# Patient Record
Sex: FEMALE | Race: WHITE | NOT HISPANIC OR LATINO | Employment: OTHER | URBAN - METROPOLITAN AREA
[De-identification: names, ages, dates, MRNs, and addresses within clinical notes are randomized per-mention and may not be internally consistent; named-entity substitution may affect disease eponyms.]

---

## 2017-04-06 ENCOUNTER — RECORDS - HEALTHEAST (OUTPATIENT)
Dept: LAB | Facility: CLINIC | Age: 77
End: 2017-04-06

## 2017-04-06 LAB
CHOLEST SERPL-MCNC: 169 MG/DL
FASTING STATUS PATIENT QL REPORTED: ABNORMAL
HDLC SERPL-MCNC: 35 MG/DL
LDLC SERPL CALC-MCNC: 103 MG/DL
TRIGL SERPL-MCNC: 153 MG/DL

## 2018-05-01 ENCOUNTER — RECORDS - HEALTHEAST (OUTPATIENT)
Dept: LAB | Facility: CLINIC | Age: 78
End: 2018-05-01

## 2018-05-01 LAB
ANION GAP SERPL CALCULATED.3IONS-SCNC: 10 MMOL/L (ref 5–18)
BUN SERPL-MCNC: 15 MG/DL (ref 8–28)
CALCIUM SERPL-MCNC: 9.9 MG/DL (ref 8.5–10.5)
CHLORIDE BLD-SCNC: 104 MMOL/L (ref 98–107)
CHOLEST SERPL-MCNC: 180 MG/DL
CO2 SERPL-SCNC: 25 MMOL/L (ref 22–31)
CREAT SERPL-MCNC: 0.73 MG/DL (ref 0.6–1.1)
FASTING STATUS PATIENT QL REPORTED: ABNORMAL
GFR SERPL CREATININE-BSD FRML MDRD: >60 ML/MIN/1.73M2
GLUCOSE BLD-MCNC: 91 MG/DL (ref 70–125)
HDLC SERPL-MCNC: 42 MG/DL
LDLC SERPL CALC-MCNC: 114 MG/DL
POTASSIUM BLD-SCNC: 4.7 MMOL/L (ref 3.5–5)
SODIUM SERPL-SCNC: 139 MMOL/L (ref 136–145)
TRIGL SERPL-MCNC: 121 MG/DL

## 2018-10-11 ENCOUNTER — RECORDS - HEALTHEAST (OUTPATIENT)
Dept: ADMINISTRATIVE | Facility: OTHER | Age: 78
End: 2018-10-11

## 2018-10-11 ENCOUNTER — AMBULATORY - HEALTHEAST (OUTPATIENT)
Dept: CARDIOLOGY | Facility: CLINIC | Age: 78
End: 2018-10-11

## 2018-10-17 ENCOUNTER — OFFICE VISIT - HEALTHEAST (OUTPATIENT)
Dept: CARDIOLOGY | Facility: CLINIC | Age: 78
End: 2018-10-17

## 2018-10-17 DIAGNOSIS — I10 ESSENTIAL HYPERTENSION: ICD-10-CM

## 2018-10-17 DIAGNOSIS — R00.2 PALPITATIONS: ICD-10-CM

## 2018-10-17 RX ORDER — INFLIXIMAB 100 MG/10ML
400 INJECTION, POWDER, LYOPHILIZED, FOR SOLUTION INTRAVENOUS
Status: SHIPPED | COMMUNITY
Start: 2016-08-25

## 2018-10-17 ASSESSMENT — MIFFLIN-ST. JEOR: SCORE: 1155.35

## 2018-10-22 ENCOUNTER — RECORDS - HEALTHEAST (OUTPATIENT)
Dept: LAB | Facility: CLINIC | Age: 78
End: 2018-10-22

## 2018-10-22 LAB
ALBUMIN SERPL-MCNC: 3.6 G/DL (ref 3.5–5)
ALP SERPL-CCNC: 86 U/L (ref 45–120)
ALT SERPL W P-5'-P-CCNC: 9 U/L (ref 0–45)
ANION GAP SERPL CALCULATED.3IONS-SCNC: 9 MMOL/L (ref 5–18)
AST SERPL W P-5'-P-CCNC: 17 U/L (ref 0–40)
BILIRUB SERPL-MCNC: 0.3 MG/DL (ref 0–1)
BUN SERPL-MCNC: 14 MG/DL (ref 8–28)
CALCIUM SERPL-MCNC: 9.9 MG/DL (ref 8.5–10.5)
CHLORIDE BLD-SCNC: 104 MMOL/L (ref 98–107)
CO2 SERPL-SCNC: 27 MMOL/L (ref 22–31)
CREAT SERPL-MCNC: 0.73 MG/DL (ref 0.6–1.1)
ERYTHROCYTE [SEDIMENTATION RATE] IN BLOOD BY WESTERGREN METHOD: 43 MM/HR (ref 0–20)
GFR SERPL CREATININE-BSD FRML MDRD: >60 ML/MIN/1.73M2
GLUCOSE BLD-MCNC: 87 MG/DL (ref 70–125)
POTASSIUM BLD-SCNC: 4.1 MMOL/L (ref 3.5–5)
PROT SERPL-MCNC: 7.1 G/DL (ref 6–8)
SODIUM SERPL-SCNC: 140 MMOL/L (ref 136–145)
TSH SERPL DL<=0.005 MIU/L-ACNC: 2.3 UIU/ML (ref 0.3–5)

## 2018-11-07 ENCOUNTER — HOSPITAL ENCOUNTER (OUTPATIENT)
Dept: CARDIOLOGY | Facility: CLINIC | Age: 78
Discharge: HOME | End: 2018-11-07
Attending: INTERNAL MEDICINE

## 2018-11-07 DIAGNOSIS — R00.2 PALPITATIONS: ICD-10-CM

## 2018-11-07 LAB
AORTIC ROOT: 2.7 CM
BSA FOR ECHO PROCEDURE: 1.78 M2
CV BLOOD PRESSURE: NORMAL MMHG
CV ECHO HEIGHT: 65 IN
CV ECHO WEIGHT: 152 LBS
DOP CALC LVOT AREA: 2.83 CM2
DOP CALC LVOT DIAMETER: 1.9 CM
DOP CALC LVOT PEAK VEL: 80.5 CM/S
DOP CALC LVOT STROKE VOLUME: 63.8 CM3
DOP CALCLVOT PEAK VEL VTI: 22.5 CM
EJECTION FRACTION: 56 % (ref 55–75)
FRACTIONAL SHORTENING: 28.2 % (ref 28–44)
INTERVENTRICULAR SEPTUM IN END DIASTOLE: 1 CM (ref 0.6–0.9)
IVS/PW RATIO: 1.2
LA AREA 1: 10.5 CM2
LA AREA 2: 18.3 CM2
LEFT ATRIUM LENGTH: 3.39 CM
LEFT ATRIUM SIZE: 2.9 CM
LEFT ATRIUM TO AORTIC ROOT RATIO: 1.07 NO UNITS
LEFT ATRIUM VOLUME INDEX: 27.1 ML/M2
LEFT ATRIUM VOLUME: 48.2 ML
LEFT VENTRICLE DIASTOLIC VOLUME INDEX: 25.3 CM3/M2 (ref 34–74)
LEFT VENTRICLE DIASTOLIC VOLUME: 45 CM3 (ref 46–106)
LEFT VENTRICLE MASS INDEX: 61.9 G/M2
LEFT VENTRICLE SYSTOLIC VOLUME INDEX: 11.2 CM3/M2 (ref 11–31)
LEFT VENTRICLE SYSTOLIC VOLUME: 20 CM3 (ref 14–42)
LEFT VENTRICULAR INTERNAL DIMENSION IN DIASTOLE: 3.94 CM (ref 3.8–5.2)
LEFT VENTRICULAR INTERNAL DIMENSION IN SYSTOLE: 2.83 CM (ref 2.2–3.5)
LEFT VENTRICULAR MASS: 110.2 G
LEFT VENTRICULAR OUTFLOW TRACT MEAN GRADIENT: 2 MMHG
LEFT VENTRICULAR OUTFLOW TRACT MEAN VELOCITY: 57.3 CM/S
LEFT VENTRICULAR OUTFLOW TRACT PEAK GRADIENT: 3 MMHG
LEFT VENTRICULAR POSTERIOR WALL IN END DIASTOLE: 0.84 CM (ref 0.6–0.9)
LV STROKE VOLUME INDEX: 35.8 ML/M2
MITRAL VALVE E/A RATIO: 1
MV AVERAGE E/E' RATIO: 12.5 CM/S
MV E'TISSUE VEL-LAT: 5.56 CM/S
MV E'TISSUE VEL-MED: 5.17 CM/S
MV LATERAL E/E' RATIO: 12.1
MV MEDIAL E/E' RATIO: 13
MV PEAK A VELOCITY: 69.6 CM/S
MV PEAK E VELOCITY: 67.1 CM/S
NUC REST DIASTOLIC VOLUME INDEX: 2432 LBS
NUC REST SYSTOLIC VOLUME INDEX: 65 IN
TRICUSPID REGURGITATION PEAK PRESSURE GRADIENT: 30.3 MMHG
TRICUSPID VALVE ANULAR PLANE SYSTOLIC EXCURSION: 2.5 CM
TRICUSPID VALVE PEAK REGURGITANT VELOCITY: 275 CM/S

## 2018-11-07 ASSESSMENT — MIFFLIN-ST. JEOR: SCORE: 1155.35

## 2018-11-08 ENCOUNTER — COMMUNICATION - HEALTHEAST (OUTPATIENT)
Dept: CARDIOLOGY | Facility: CLINIC | Age: 78
End: 2018-11-08

## 2018-11-09 ENCOUNTER — OFFICE VISIT - HEALTHEAST (OUTPATIENT)
Dept: CARDIOLOGY | Facility: CLINIC | Age: 78
End: 2018-11-09

## 2018-11-09 DIAGNOSIS — K21.9 GASTROESOPHAGEAL REFLUX DISEASE WITHOUT ESOPHAGITIS: ICD-10-CM

## 2018-11-09 DIAGNOSIS — I48.91 ATRIAL FIBRILLATION (H): ICD-10-CM

## 2018-11-09 DIAGNOSIS — I10 BENIGN ESSENTIAL HYPERTENSION: ICD-10-CM

## 2018-11-09 DIAGNOSIS — M43.20 ANKYLOSIS OF SPINE: ICD-10-CM

## 2018-11-09 ASSESSMENT — MIFFLIN-ST. JEOR: SCORE: 1153.99

## 2018-11-28 ENCOUNTER — COMMUNICATION - HEALTHEAST (OUTPATIENT)
Dept: CARDIOLOGY | Facility: CLINIC | Age: 78
End: 2018-11-28

## 2018-11-28 DIAGNOSIS — I48.91 A-FIB (H): ICD-10-CM

## 2018-12-14 ENCOUNTER — OFFICE VISIT - HEALTHEAST (OUTPATIENT)
Dept: CARDIOLOGY | Facility: CLINIC | Age: 78
End: 2018-12-14

## 2018-12-14 DIAGNOSIS — I48.0 PAROXYSMAL ATRIAL FIBRILLATION (H): ICD-10-CM

## 2018-12-14 ASSESSMENT — MIFFLIN-ST. JEOR: SCORE: 1149.45

## 2018-12-17 ENCOUNTER — AMBULATORY - HEALTHEAST (OUTPATIENT)
Dept: CARDIOLOGY | Facility: CLINIC | Age: 78
End: 2018-12-17

## 2018-12-17 DIAGNOSIS — I48.0 PAROXYSMAL ATRIAL FIBRILLATION (H): ICD-10-CM

## 2018-12-17 LAB
ATRIAL RATE - MUSE: 52 BPM
DIASTOLIC BLOOD PRESSURE - MUSE: NORMAL MMHG
INTERPRETATION ECG - MUSE: NORMAL
P AXIS - MUSE: 23 DEGREES
PR INTERVAL - MUSE: 150 MS
QRS DURATION - MUSE: 76 MS
QT - MUSE: 434 MS
QTC - MUSE: 403 MS
R AXIS - MUSE: 9 DEGREES
SYSTOLIC BLOOD PRESSURE - MUSE: NORMAL MMHG
T AXIS - MUSE: 43 DEGREES
VENTRICULAR RATE- MUSE: 52 BPM

## 2018-12-17 ASSESSMENT — MIFFLIN-ST. JEOR: SCORE: 1137.2

## 2019-04-23 ENCOUNTER — OFFICE VISIT - HEALTHEAST (OUTPATIENT)
Dept: CARDIOLOGY | Facility: CLINIC | Age: 79
End: 2019-04-23

## 2019-04-23 DIAGNOSIS — I10 BENIGN ESSENTIAL HYPERTENSION: ICD-10-CM

## 2019-04-23 DIAGNOSIS — I48.0 PAROXYSMAL ATRIAL FIBRILLATION (H): ICD-10-CM

## 2019-04-23 ASSESSMENT — MIFFLIN-ST. JEOR: SCORE: 1123.6

## 2019-06-14 ENCOUNTER — COMMUNICATION - HEALTHEAST (OUTPATIENT)
Dept: CARDIOLOGY | Facility: CLINIC | Age: 79
End: 2019-06-14

## 2019-06-17 ENCOUNTER — COMMUNICATION - HEALTHEAST (OUTPATIENT)
Dept: CARDIOLOGY | Facility: CLINIC | Age: 79
End: 2019-06-17

## 2019-07-24 ENCOUNTER — AMBULATORY - HEALTHEAST (OUTPATIENT)
Dept: CARDIOLOGY | Facility: CLINIC | Age: 79
End: 2019-07-24

## 2019-07-24 ENCOUNTER — RECORDS - HEALTHEAST (OUTPATIENT)
Dept: ADMINISTRATIVE | Facility: OTHER | Age: 79
End: 2019-07-24

## 2019-07-29 ENCOUNTER — OFFICE VISIT - HEALTHEAST (OUTPATIENT)
Dept: CARDIOLOGY | Facility: CLINIC | Age: 79
End: 2019-07-29

## 2019-07-29 DIAGNOSIS — I48.0 PAROXYSMAL ATRIAL FIBRILLATION (H): ICD-10-CM

## 2019-07-29 DIAGNOSIS — I10 BENIGN ESSENTIAL HYPERTENSION: ICD-10-CM

## 2019-07-29 ASSESSMENT — MIFFLIN-ST. JEOR: SCORE: 1137.2

## 2019-08-23 ENCOUNTER — OFFICE VISIT - HEALTHEAST (OUTPATIENT)
Dept: CARDIOLOGY | Facility: CLINIC | Age: 79
End: 2019-08-23

## 2019-08-23 DIAGNOSIS — R06.09 EXERTIONAL DYSPNEA: ICD-10-CM

## 2019-08-23 LAB
ANION GAP SERPL CALCULATED.3IONS-SCNC: 7 MMOL/L (ref 5–18)
BASOPHILS # BLD AUTO: 0.1 THOU/UL (ref 0–0.2)
BASOPHILS NFR BLD AUTO: 1 % (ref 0–2)
BUN SERPL-MCNC: 19 MG/DL (ref 8–28)
CALCIUM SERPL-MCNC: 9.8 MG/DL (ref 8.5–10.5)
CHLORIDE BLD-SCNC: 105 MMOL/L (ref 98–107)
CO2 SERPL-SCNC: 25 MMOL/L (ref 22–31)
CREAT SERPL-MCNC: 0.78 MG/DL (ref 0.6–1.1)
EOSINOPHIL # BLD AUTO: 0.1 THOU/UL (ref 0–0.4)
EOSINOPHIL NFR BLD AUTO: 1 % (ref 0–6)
ERYTHROCYTE [DISTWIDTH] IN BLOOD BY AUTOMATED COUNT: 17.2 % (ref 11–14.5)
GFR SERPL CREATININE-BSD FRML MDRD: >60 ML/MIN/1.73M2
GLUCOSE BLD-MCNC: 84 MG/DL (ref 70–125)
HCT VFR BLD AUTO: 31.4 % (ref 35–47)
HGB BLD-MCNC: 9 G/DL (ref 12–16)
LYMPHOCYTES # BLD AUTO: 3.7 THOU/UL (ref 0.8–4.4)
LYMPHOCYTES NFR BLD AUTO: 31 % (ref 20–40)
MCH RBC QN AUTO: 21.5 PG (ref 27–34)
MCHC RBC AUTO-ENTMCNC: 28.7 G/DL (ref 32–36)
MCV RBC AUTO: 75 FL (ref 80–100)
MONOCYTES # BLD AUTO: 1 THOU/UL (ref 0–0.9)
MONOCYTES NFR BLD AUTO: 8 % (ref 2–10)
NEUTROPHILS # BLD AUTO: 7.1 THOU/UL (ref 2–7.7)
NEUTROPHILS NFR BLD AUTO: 59 % (ref 50–70)
PLATELET # BLD AUTO: 382 THOU/UL (ref 140–440)
PMV BLD AUTO: 10.3 FL (ref 8.5–12.5)
POTASSIUM BLD-SCNC: 5.1 MMOL/L (ref 3.5–5)
RBC # BLD AUTO: 4.19 MILL/UL (ref 3.8–5.4)
SODIUM SERPL-SCNC: 137 MMOL/L (ref 136–145)
WBC: 12 THOU/UL (ref 4–11)

## 2019-08-23 ASSESSMENT — MIFFLIN-ST. JEOR: SCORE: 1143.1

## 2019-08-26 ENCOUNTER — AMBULATORY - HEALTHEAST (OUTPATIENT)
Dept: CARDIOLOGY | Facility: CLINIC | Age: 79
End: 2019-08-26

## 2019-08-26 ENCOUNTER — SURGERY - HEALTHEAST (OUTPATIENT)
Dept: CARDIOLOGY | Facility: CLINIC | Age: 79
End: 2019-08-26

## 2019-08-26 DIAGNOSIS — I48.0 PAROXYSMAL ATRIAL FIBRILLATION (H): ICD-10-CM

## 2019-08-28 ENCOUNTER — COMMUNICATION - HEALTHEAST (OUTPATIENT)
Dept: CARDIOLOGY | Facility: CLINIC | Age: 79
End: 2019-08-28

## 2019-08-30 ENCOUNTER — HOSPITAL ENCOUNTER (OUTPATIENT)
Dept: NUCLEAR MEDICINE | Facility: CLINIC | Age: 79
Discharge: HOME | End: 2019-08-30
Attending: INTERNAL MEDICINE

## 2019-08-30 ENCOUNTER — HOSPITAL ENCOUNTER (OUTPATIENT)
Dept: CARDIOLOGY | Facility: CLINIC | Age: 79
Discharge: HOME | End: 2019-08-30
Attending: INTERNAL MEDICINE

## 2019-08-30 DIAGNOSIS — R06.09 OTHER FORMS OF DYSPNEA: ICD-10-CM

## 2019-08-30 DIAGNOSIS — R06.09 EXERTIONAL DYSPNEA: ICD-10-CM

## 2019-08-30 LAB
CV STRESS CURRENT BP HE: NORMAL
CV STRESS CURRENT HR HE: 101
CV STRESS CURRENT HR HE: 102
CV STRESS CURRENT HR HE: 102
CV STRESS CURRENT HR HE: 60
CV STRESS CURRENT HR HE: 67
CV STRESS CURRENT HR HE: 68
CV STRESS CURRENT HR HE: 69
CV STRESS CURRENT HR HE: 75
CV STRESS CURRENT HR HE: 76
CV STRESS CURRENT HR HE: 77
CV STRESS CURRENT HR HE: 78
CV STRESS CURRENT HR HE: 79
CV STRESS CURRENT HR HE: 80
CV STRESS CURRENT HR HE: 80
CV STRESS CURRENT HR HE: 82
CV STRESS CURRENT HR HE: 83
CV STRESS CURRENT HR HE: 83
CV STRESS CURRENT HR HE: 85
CV STRESS CURRENT HR HE: 86
CV STRESS CURRENT HR HE: 86
CV STRESS CURRENT HR HE: 88
CV STRESS CURRENT HR HE: 90
CV STRESS CURRENT HR HE: 97
CV STRESS DEVIATION TIME HE: NORMAL
CV STRESS ECHO PERCENT HR HE: NORMAL
CV STRESS EXERCISE STAGE HE: NORMAL
CV STRESS FINAL RESTING BP HE: NORMAL
CV STRESS FINAL RESTING HR HE: 68
CV STRESS MAX HR HE: 103
CV STRESS MAX TREADMILL GRADE HE: 0
CV STRESS MAX TREADMILL SPEED HE: 0
CV STRESS PEAK DIA BP HE: NORMAL
CV STRESS PEAK SYS BP HE: NORMAL
CV STRESS PHASE HE: NORMAL
CV STRESS PROTOCOL HE: NORMAL
CV STRESS RESTING PT POSITION HE: NORMAL
CV STRESS RESTING PT POSITION HE: NORMAL
CV STRESS ST DEVIATION AMOUNT HE: NORMAL
CV STRESS ST DEVIATION ELEVATION HE: NORMAL
CV STRESS ST EVELATION AMOUNT HE: NORMAL
CV STRESS TEST TYPE HE: NORMAL
CV STRESS TOTAL STAGE TIME MIN 1 HE: NORMAL
NUC STRESS EJECTION FRACTION: 81 %
STRESS ECHO BASELINE BP: NORMAL
STRESS ECHO BASELINE HR: 60
STRESS ECHO CALCULATED PERCENT HR: 74 %
STRESS ECHO LAST STRESS BP: NORMAL
STRESS ECHO LAST STRESS HR: 77
STRESS ECHO POST ESTIMATED WORKLOAD: 5.4
STRESS ECHO POST EXERCISE DUR MIN: 9
STRESS ECHO POST EXERCISE DUR SEC: 0
STRESS ECHO TARGET HR: 119

## 2019-09-17 ENCOUNTER — HOSPITAL ENCOUNTER (OUTPATIENT)
Dept: CT IMAGING | Facility: CLINIC | Age: 79
Discharge: HOME | End: 2019-09-17
Attending: INTERNAL MEDICINE

## 2019-09-17 DIAGNOSIS — I48.0 PAROXYSMAL ATRIAL FIBRILLATION (H): ICD-10-CM

## 2019-09-17 LAB — BSA FOR ECHO PROCEDURE: 0 M2

## 2019-09-24 ENCOUNTER — SURGERY - HEALTHEAST (OUTPATIENT)
Dept: CARDIOLOGY | Facility: CLINIC | Age: 79
End: 2019-09-24

## 2019-09-24 ENCOUNTER — OFFICE VISIT - HEALTHEAST (OUTPATIENT)
Dept: CARDIOLOGY | Facility: CLINIC | Age: 79
End: 2019-09-24

## 2019-09-24 ENCOUNTER — COMMUNICATION - HEALTHEAST (OUTPATIENT)
Dept: CARDIOLOGY | Facility: CLINIC | Age: 79
End: 2019-09-24

## 2019-09-24 DIAGNOSIS — I48.0 PAROXYSMAL ATRIAL FIBRILLATION (H): ICD-10-CM

## 2019-09-24 DIAGNOSIS — I10 BENIGN ESSENTIAL HYPERTENSION: ICD-10-CM

## 2019-09-24 LAB
ANION GAP SERPL CALCULATED.3IONS-SCNC: 5 MMOL/L (ref 5–18)
ATRIAL RATE - MUSE: 50 BPM
BUN SERPL-MCNC: 16 MG/DL (ref 8–28)
CALCIUM SERPL-MCNC: 9.8 MG/DL (ref 8.5–10.5)
CHLORIDE BLD-SCNC: 103 MMOL/L (ref 98–107)
CO2 SERPL-SCNC: 29 MMOL/L (ref 22–31)
CREAT SERPL-MCNC: 0.74 MG/DL (ref 0.6–1.1)
DIASTOLIC BLOOD PRESSURE - MUSE: NORMAL
ERYTHROCYTE [DISTWIDTH] IN BLOOD BY AUTOMATED COUNT: 26 % (ref 11–14.5)
GFR SERPL CREATININE-BSD FRML MDRD: >60 ML/MIN/1.73M2
GLUCOSE BLD-MCNC: 97 MG/DL (ref 70–125)
HCT VFR BLD AUTO: 36.8 % (ref 35–47)
HGB BLD-MCNC: 10.9 G/DL (ref 12–16)
INTERPRETATION ECG - MUSE: NORMAL
MCH RBC QN AUTO: 24.3 PG (ref 27–34)
MCHC RBC AUTO-ENTMCNC: 29.6 G/DL (ref 32–36)
MCV RBC AUTO: 82 FL (ref 80–100)
P AXIS - MUSE: 22 DEGREES
PLATELET # BLD AUTO: 295 THOU/UL (ref 140–440)
PMV BLD AUTO: 10.2 FL (ref 8.5–12.5)
POTASSIUM BLD-SCNC: 5 MMOL/L (ref 3.5–5)
PR INTERVAL - MUSE: 150 MS
QRS DURATION - MUSE: 68 MS
QT - MUSE: 452 MS
QTC - MUSE: 412 MS
R AXIS - MUSE: 0 DEGREES
RBC # BLD AUTO: 4.48 MILL/UL (ref 3.8–5.4)
SODIUM SERPL-SCNC: 137 MMOL/L (ref 136–145)
SYSTOLIC BLOOD PRESSURE - MUSE: NORMAL
T AXIS - MUSE: 20 DEGREES
VENTRICULAR RATE- MUSE: 50 BPM
WBC: 7.1 THOU/UL (ref 4–11)

## 2019-09-24 ASSESSMENT — MIFFLIN-ST. JEOR: SCORE: 1137.2

## 2019-09-30 ENCOUNTER — ANESTHESIA - HEALTHEAST (OUTPATIENT)
Dept: CARDIOLOGY | Facility: CLINIC | Age: 79
End: 2019-09-30

## 2019-10-01 ENCOUNTER — SURGERY - HEALTHEAST (OUTPATIENT)
Dept: CARDIOLOGY | Facility: CLINIC | Age: 79
End: 2019-10-01

## 2019-10-01 ASSESSMENT — MIFFLIN-ST. JEOR: SCORE: 1142.87

## 2019-10-02 ASSESSMENT — MIFFLIN-ST. JEOR: SCORE: 1146.84

## 2019-10-04 ENCOUNTER — AMBULATORY - HEALTHEAST (OUTPATIENT)
Dept: CARDIOLOGY | Facility: CLINIC | Age: 79
End: 2019-10-04

## 2019-10-04 ENCOUNTER — COMMUNICATION - HEALTHEAST (OUTPATIENT)
Dept: CARDIOLOGY | Facility: CLINIC | Age: 79
End: 2019-10-04

## 2019-10-04 DIAGNOSIS — Z98.890 STATUS POST CATHETER ABLATION OF ATRIAL FIBRILLATION: ICD-10-CM

## 2019-10-04 DIAGNOSIS — R94.31 ABNORMAL ELECTROCARDIOGRAM (ECG) (EKG): ICD-10-CM

## 2019-10-04 LAB
ANION GAP SERPL CALCULATED.3IONS-SCNC: 5 MMOL/L (ref 5–18)
BUN SERPL-MCNC: 13 MG/DL (ref 8–28)
CALCIUM SERPL-MCNC: 9.7 MG/DL (ref 8.5–10.5)
CHLORIDE BLD-SCNC: 104 MMOL/L (ref 98–107)
CO2 SERPL-SCNC: 28 MMOL/L (ref 22–31)
CREAT SERPL-MCNC: 0.73 MG/DL (ref 0.6–1.1)
ERYTHROCYTE [DISTWIDTH] IN BLOOD BY AUTOMATED COUNT: ABNORMAL %
GFR SERPL CREATININE-BSD FRML MDRD: >60 ML/MIN/1.73M2
GLUCOSE BLD-MCNC: 99 MG/DL (ref 70–125)
HCT VFR BLD AUTO: 31.8 % (ref 35–47)
HGB BLD-MCNC: 9.4 G/DL (ref 12–16)
MCH RBC QN AUTO: 24.7 PG (ref 27–34)
MCHC RBC AUTO-ENTMCNC: 29.6 G/DL (ref 32–36)
MCV RBC AUTO: 84 FL (ref 80–100)
PLATELET # BLD AUTO: 285 THOU/UL (ref 140–440)
PMV BLD AUTO: 10.9 FL (ref 8.5–12.5)
POTASSIUM BLD-SCNC: 4.7 MMOL/L (ref 3.5–5)
RBC # BLD AUTO: 3.81 MILL/UL (ref 3.8–5.4)
SODIUM SERPL-SCNC: 137 MMOL/L (ref 136–145)
WBC: 8.1 THOU/UL (ref 4–11)

## 2019-10-04 ASSESSMENT — MIFFLIN-ST. JEOR: SCORE: 1143.1

## 2019-10-07 ENCOUNTER — RECORDS - HEALTHEAST (OUTPATIENT)
Dept: LAB | Facility: CLINIC | Age: 79
End: 2019-10-07

## 2019-10-07 LAB
FERRITIN SERPL-MCNC: 33 NG/ML (ref 10–130)
IRON SERPL-MCNC: 304 UG/DL (ref 42–175)

## 2019-10-10 ENCOUNTER — AMBULATORY - HEALTHEAST (OUTPATIENT)
Dept: OTHER | Facility: CLINIC | Age: 79
End: 2019-10-10

## 2019-10-21 ENCOUNTER — RECORDS - HEALTHEAST (OUTPATIENT)
Dept: LAB | Facility: CLINIC | Age: 79
End: 2019-10-21

## 2019-10-21 LAB
ANION GAP SERPL CALCULATED.3IONS-SCNC: 8 MMOL/L (ref 5–18)
BUN SERPL-MCNC: 13 MG/DL (ref 8–28)
CALCIUM SERPL-MCNC: 9.9 MG/DL (ref 8.5–10.5)
CHLORIDE BLD-SCNC: 103 MMOL/L (ref 98–107)
CO2 SERPL-SCNC: 28 MMOL/L (ref 22–31)
CREAT SERPL-MCNC: 0.74 MG/DL (ref 0.6–1.1)
GFR SERPL CREATININE-BSD FRML MDRD: >60 ML/MIN/1.73M2
GLUCOSE BLD-MCNC: 97 MG/DL (ref 70–125)
IRON SERPL-MCNC: 35 UG/DL (ref 42–175)
POTASSIUM BLD-SCNC: 4.6 MMOL/L (ref 3.5–5)
SODIUM SERPL-SCNC: 139 MMOL/L (ref 136–145)
VIT B12 SERPL-MCNC: 265 PG/ML (ref 213–816)

## 2019-10-22 LAB
BASOPHILS # BLD AUTO: 0 THOU/UL (ref 0–0.2)
BASOPHILS NFR BLD AUTO: 1 % (ref 0–2)
EOSINOPHIL # BLD AUTO: 0.2 THOU/UL (ref 0–0.4)
EOSINOPHIL NFR BLD AUTO: 2 % (ref 0–6)
ERYTHROCYTE [DISTWIDTH] IN BLOOD BY AUTOMATED COUNT: 25.2 % (ref 11–14.5)
HCT VFR BLD AUTO: 36.5 % (ref 35–47)
HGB BLD-MCNC: 10.7 G/DL (ref 12–16)
LAB AP CHARGES (HE HISTORICAL CONVERSION): NORMAL
LYMPHOCYTES # BLD AUTO: 2.7 THOU/UL (ref 0.8–4.4)
LYMPHOCYTES NFR BLD AUTO: 36 % (ref 20–40)
MCH RBC QN AUTO: 26.8 PG (ref 27–34)
MCHC RBC AUTO-ENTMCNC: 29.3 G/DL (ref 32–36)
MCV RBC AUTO: 92 FL (ref 80–100)
MONOCYTES # BLD AUTO: 1 THOU/UL (ref 0–0.9)
MONOCYTES NFR BLD AUTO: 14 % (ref 2–10)
NEUTROPHILS # BLD AUTO: 3.6 THOU/UL (ref 2–7.7)
NEUTROPHILS NFR BLD AUTO: 48 % (ref 50–70)
PATH REPORT.COMMENTS IMP SPEC: NORMAL
PATH REPORT.COMMENTS IMP SPEC: NORMAL
PATH REPORT.FINAL DX SPEC: NORMAL
PATH REPORT.MICROSCOPIC SPEC OTHER STN: ABNORMAL
PATH REPORT.MICROSCOPIC SPEC OTHER STN: NORMAL
PLATELET # BLD AUTO: 331 THOU/UL (ref 140–440)
PMV BLD AUTO: 10.9 FL (ref 8.5–12.5)
RBC # BLD AUTO: 3.99 MILL/UL (ref 3.8–5.4)
WBC: 7.5 THOU/UL (ref 4–11)

## 2019-11-08 ENCOUNTER — AMBULATORY - HEALTHEAST (OUTPATIENT)
Dept: CARDIOLOGY | Facility: CLINIC | Age: 79
End: 2019-11-08

## 2019-11-08 ENCOUNTER — RECORDS - HEALTHEAST (OUTPATIENT)
Dept: ADMINISTRATIVE | Facility: OTHER | Age: 79
End: 2019-11-08

## 2019-11-11 ENCOUNTER — COMMUNICATION - HEALTHEAST (OUTPATIENT)
Dept: CARDIOLOGY | Facility: CLINIC | Age: 79
End: 2019-11-11

## 2019-11-11 DIAGNOSIS — I48.91 ATRIAL FIBRILLATION (H): ICD-10-CM

## 2019-11-14 ENCOUNTER — OFFICE VISIT - HEALTHEAST (OUTPATIENT)
Dept: CARDIOLOGY | Facility: CLINIC | Age: 79
End: 2019-11-14

## 2019-11-14 DIAGNOSIS — Z98.890 STATUS POST CATHETER ABLATION OF ATRIAL FIBRILLATION: ICD-10-CM

## 2019-11-14 DIAGNOSIS — I10 ESSENTIAL HYPERTENSION: ICD-10-CM

## 2019-11-14 DIAGNOSIS — I48.0 PAROXYSMAL ATRIAL FIBRILLATION (H): ICD-10-CM

## 2019-11-14 DIAGNOSIS — I48.91 ATRIAL FIBRILLATION (H): ICD-10-CM

## 2019-11-14 ASSESSMENT — MIFFLIN-ST. JEOR: SCORE: 1137.2

## 2019-12-02 ENCOUNTER — HOSPITAL ENCOUNTER (OUTPATIENT)
Dept: CARDIOLOGY | Facility: CLINIC | Age: 79
Discharge: HOME | End: 2019-12-02
Attending: NURSE PRACTITIONER

## 2019-12-02 DIAGNOSIS — I48.0 PAROXYSMAL ATRIAL FIBRILLATION (H): ICD-10-CM

## 2019-12-02 DIAGNOSIS — Z98.890 STATUS POST CATHETER ABLATION OF ATRIAL FIBRILLATION: ICD-10-CM

## 2019-12-06 ENCOUNTER — COMMUNICATION - HEALTHEAST (OUTPATIENT)
Dept: CARDIOLOGY | Facility: CLINIC | Age: 79
End: 2019-12-06

## 2020-02-26 ENCOUNTER — OFFICE VISIT (OUTPATIENT)
Dept: URBAN - METROPOLITAN AREA CLINIC 16 | Facility: CLINIC | Age: 80
End: 2020-02-26
Payer: MEDICARE

## 2020-02-26 DIAGNOSIS — H20.022 RECURRENT ACUTE IRIDOCYCLITIS, LEFT EYE: Primary | ICD-10-CM

## 2020-02-26 PROCEDURE — 92002 INTRM OPH EXAM NEW PATIENT: CPT | Performed by: OPTOMETRIST

## 2020-02-26 RX ORDER — PREDNISOLONE ACETATE 10 MG/ML
1 % SUSPENSION/ DROPS OPHTHALMIC
Qty: 1 | Refills: 0 | Status: ACTIVE
Start: 2020-02-26

## 2020-02-26 ASSESSMENT — INTRAOCULAR PRESSURE
OD: 13
OS: 14

## 2020-02-26 NOTE — IMPRESSION/PLAN
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 Plan: Discussed diagnosis in detail with patient. New medication(s) Rx given today.

## 2020-03-05 ENCOUNTER — OFFICE VISIT (OUTPATIENT)
Dept: URBAN - METROPOLITAN AREA CLINIC 16 | Facility: CLINIC | Age: 80
End: 2020-03-05
Payer: MEDICARE

## 2020-03-05 DIAGNOSIS — Z96.1 PRESENCE OF INTRAOCULAR LENS: ICD-10-CM

## 2020-03-05 PROCEDURE — 92012 INTRM OPH EXAM EST PATIENT: CPT | Performed by: OPTOMETRIST

## 2020-03-05 ASSESSMENT — INTRAOCULAR PRESSURE
OD: 13
OS: 14

## 2020-03-05 NOTE — IMPRESSION/PLAN
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  Improving Plan: PF QID x 1 week, then TID OS

## 2020-05-04 ENCOUNTER — COMMUNICATION - HEALTHEAST (OUTPATIENT)
Dept: CARDIOLOGY | Facility: CLINIC | Age: 80
End: 2020-05-04

## 2020-05-19 ENCOUNTER — COMMUNICATION - HEALTHEAST (OUTPATIENT)
Dept: CARDIOLOGY | Facility: CLINIC | Age: 80
End: 2020-05-19

## 2020-05-19 DIAGNOSIS — I48.91 ATRIAL FIBRILLATION (H): ICD-10-CM

## 2020-06-29 ENCOUNTER — OFFICE VISIT - HEALTHEAST (OUTPATIENT)
Dept: CARDIOLOGY | Facility: CLINIC | Age: 80
End: 2020-06-29

## 2020-06-29 DIAGNOSIS — I48.19 PERSISTENT ATRIAL FIBRILLATION (H): ICD-10-CM

## 2020-10-21 ENCOUNTER — COMMUNICATION - HEALTHEAST (OUTPATIENT)
Dept: CARDIOLOGY | Facility: CLINIC | Age: 80
End: 2020-10-21

## 2020-10-21 DIAGNOSIS — I10 ESSENTIAL HYPERTENSION: ICD-10-CM

## 2020-10-26 ENCOUNTER — RECORDS - HEALTHEAST (OUTPATIENT)
Dept: ADMINISTRATIVE | Facility: OTHER | Age: 80
End: 2020-10-26

## 2020-10-27 ENCOUNTER — OFFICE VISIT - HEALTHEAST (OUTPATIENT)
Dept: CARDIOLOGY | Facility: CLINIC | Age: 80
End: 2020-10-27

## 2020-10-27 DIAGNOSIS — I10 ESSENTIAL HYPERTENSION: ICD-10-CM

## 2020-10-27 DIAGNOSIS — I48.91 ATRIAL FIBRILLATION (H): ICD-10-CM

## 2020-10-27 DIAGNOSIS — Z98.890 STATUS POST CATHETER ABLATION OF ATRIAL FIBRILLATION: ICD-10-CM

## 2020-10-27 DIAGNOSIS — I48.0 PAROXYSMAL ATRIAL FIBRILLATION (H): ICD-10-CM

## 2020-10-27 RX ORDER — METOPROLOL SUCCINATE 25 MG/1
25 TABLET, EXTENDED RELEASE ORAL DAILY
Qty: 90 TABLET | Refills: 3 | Status: SHIPPED | OUTPATIENT
Start: 2020-10-27 | End: 2022-01-17

## 2020-10-30 ENCOUNTER — RECORDS - HEALTHEAST (OUTPATIENT)
Dept: LAB | Facility: CLINIC | Age: 80
End: 2020-10-30

## 2020-10-30 LAB
ANION GAP SERPL CALCULATED.3IONS-SCNC: 5 MMOL/L (ref 5–18)
BUN SERPL-MCNC: 16 MG/DL (ref 8–28)
CALCIUM SERPL-MCNC: 9.7 MG/DL (ref 8.5–10.5)
CHLORIDE BLD-SCNC: 102 MMOL/L (ref 98–107)
CHOLEST SERPL-MCNC: 155 MG/DL
CO2 SERPL-SCNC: 31 MMOL/L (ref 22–31)
CREAT SERPL-MCNC: 0.8 MG/DL (ref 0.6–1.1)
FASTING STATUS PATIENT QL REPORTED: ABNORMAL
GFR SERPL CREATININE-BSD FRML MDRD: >60 ML/MIN/1.73M2
GLUCOSE BLD-MCNC: 83 MG/DL (ref 70–125)
HDLC SERPL-MCNC: 32 MG/DL
IRON SERPL-MCNC: 76 UG/DL (ref 42–175)
LDLC SERPL CALC-MCNC: 93 MG/DL
POTASSIUM BLD-SCNC: 4.8 MMOL/L (ref 3.5–5)
SODIUM SERPL-SCNC: 138 MMOL/L (ref 136–145)
TRIGL SERPL-MCNC: 151 MG/DL

## 2020-11-18 ENCOUNTER — COMMUNICATION - HEALTHEAST (OUTPATIENT)
Dept: CARDIOLOGY | Facility: CLINIC | Age: 80
End: 2020-11-18

## 2021-04-20 ENCOUNTER — COMMUNICATION - HEALTHEAST (OUTPATIENT)
Dept: CARDIOLOGY | Facility: CLINIC | Age: 81
End: 2021-04-20

## 2021-04-20 DIAGNOSIS — I48.91 ATRIAL FIBRILLATION (H): ICD-10-CM

## 2021-04-20 RX ORDER — APIXABAN 5 MG/1
TABLET, FILM COATED ORAL
Qty: 180 TABLET | Refills: 1 | Status: SHIPPED | OUTPATIENT
Start: 2021-04-20 | End: 2021-11-10

## 2021-05-11 ENCOUNTER — COMMUNICATION - HEALTHEAST (OUTPATIENT)
Dept: CARDIOLOGY | Facility: CLINIC | Age: 81
End: 2021-05-11

## 2021-05-27 ENCOUNTER — RECORDS - HEALTHEAST (OUTPATIENT)
Dept: ADMINISTRATIVE | Facility: CLINIC | Age: 81
End: 2021-05-27

## 2021-05-29 NOTE — TELEPHONE ENCOUNTER
Call from 6/14 was reviewed by Shan Meng CNP,   Pt is feeling fine reviewed no missed doses of meds, no reoccurance  Pt was told to keep hydrated and call if episodes return for med directions  Pt agrees to plan and has my direct number for call

## 2021-05-29 NOTE — TELEPHONE ENCOUNTER
Pt was seen by Ping 4/2019 see note  Pt calling for Ping today to report she had a 36 hr episode of a fib, pt states it started out just as palpitations, then was in a fib fatigue, some sl dizziness makes her nervous  Pt is fine now feeling good unable to give HR reading during her episode  Pt has a follow up with Ping 7/29  Pt states since 4/2019 has had 2 other episodes but duration was short maybe 3 hrs  Reviewed with pt to keep hydrated, if another spell thru weekend pt can call on call and Dr may increase meds if needed pt was told not to do increase meds on her own   Told pt I will review with Shan on Mon 6/17 in Ping's absence, and call pt back  Pt understands and agrees to plan

## 2021-05-30 NOTE — PATIENT INSTRUCTIONS - HE
Estrelltia Herring,    It was a pleasure to see you today at the [facility].     My recommendations after this visit include:    Continue current medications.    Consider pulmonary vein isolation ablation as an alternative to medications to treat A fib.  Another option is to do a stress test and if negative, switch sotalol to flecainide.    Follow up with Dr. Coronel or Dr. Strong to further discuss treatment options    Ping Raya, [facility], Electrophysiology  [phone]  EP nurses [phone]

## 2021-05-31 NOTE — PROGRESS NOTES
1940  Home:[phone] (home) Cell:There is no such number on file (mobile).  Emergency Contact: Tyson Herring [phone]    +++Important patient information for CSC/Cath Lab staff : None+++    [facility] EP Cath Lab Procedure Order   Ablation Type:  PVI- Atrial Fibrillation  Specific location of pathway: Left     Diagnosis:  AF  Anticipated Case Duration:  4   Scheduling Needs/Timeframe:  Next Available Please call pt to schedule    MD Preference: Dr Tae VILLA Assist:  No Specific MD:  N/A    Current Device: None None  Device Company/Device Rep Needed for Procedure: None    Pre-Procedural Testing needed: Pulmonary Vein CT/MRI  Mapping System Required:  Carto  ICE Needed:  Yes  Special equipment/Staff needed for case: None  Anesthesia:  General-Whole Case  Research Protocol:  No    [facility] EP Cath Lab Prep   Ordering Provider: Dr Strong  Ordering Date: 8/26/2019  Orders Status: Intial order placed and Order set placed    H&P:  Schedule apt with EP NP to complete within 1 wk of scheduled PVI  PCP: Chai Quintero MD, [phone]    Pre-op Labs: Done within 7 days    Medical Records Pertinent for Procedure:  Stress test 8-30-19, Holter LE 11-7-18 PAF, Echo 11-7-18 EF 56% and EKG 12-17-19 SB @52    Patient Education:    Teach with Patient: Teach with pt will be completed same day as pre-op H&P-see additional clinic note    Risks Reviewed:     Pulmonary Vein/AF/Radiofrequency Ablation  In addition to standard risks for Radiofrequency Ablation, there is:    <2% for significant pulmonary vein stenosis    <2% risk for embolic events    <1% risk for esophageal fistula    <1% risk death    Pulmonary Vein Isolation / Cryoablation Risks:    1-2% risk for phrenic nerve paralysis    <1% risk for pulmonary vein stenosis    Risk of esophageal irritation with no incidence of atrial-esophageal fistula    Rare cryoballoon rupture    <1% risk death         Cardiac Catheter Ablation    <1% risk for the following: hypotension,  hemorrhage, vascular injury including perforation of vein, artery or heart, thrombophlebitis, systemic or pulmonic emboli; cardiac perforation, (tamponade), infection, pneumothorax, arrhythmias, proarrhythmic effects of drugs, radiation exposure.    1-2% complete heart block (for AVNRT or septol accessory pathway).    <0.5% CVA or MI    <0.1% death    If external defibrillation is needed, 75% risk for superficial burn.    1-2% tamponade and aortic puncture with left sided transeptal approach for left side FLACO - increase risk of CVA to <2%.    Late arrhythmia recurrences depends upon the primary rhythm disturbance.      Consent: Will be obtained in CSC day of procedure    Pre-Procedure Instructions that were Reviewed with Patient:  NPO after midnight, Remove all jewelry prior to coming in for procedure, Shower prior to arrival, Notified patient of time and date of procedure by CV , Transportation arrangements needed s/p procedure, Post-procedure follow up process, Sedation plan/orders and Pre-procedure letter was sent to pt by CV     Pre-Procedure Medication Instructions:  Instructions given to pt regarding anticoagulants: Eliquis- instructed to continue anticoagulation uninterrupted through their procedure  Instructions given to pt regarding antiarrhythmic medication: Sotalol; Pt instructed to hold 2 days prior to procedure  Instructions for medication, other than anticoagulants/antiarrhythmics listed above, given to pt: to hold all morning medications    Allergies   Allergen Reactions     Sulfa (Sulfonamide Antibiotics) Rash     hallucinations       Current Outpatient Medications:      apixaban (ELIQUIS) 5 mg Tab tablet, Take 1 tablet (5 mg total) by mouth every 12 (twelve) hours., Disp: 60 tablet, Rfl: 11     calcium carbonate/vitamin D3 (CALCIUM+D ORAL), Take 1 tablet by mouth 2 (two) times a day., Disp: , Rfl:      inFLIXimab (REMICADE) 100 mg injection, Infuse 400 mg into a venous catheter  every 2 (two) months., Disp: , Rfl:      omeprazole (PRILOSEC) 20 MG capsule, Take 20 mg by mouth daily., Disp: , Rfl:      prednisoLONE acetate (PRED-FORTE) 1 % ophthalmic suspension, Administer 1 drop to both eyes every 4 (four) hours., Disp: , Rfl:      sotalol (BETAPACE) 80 MG tablet, Take 1 tablet (80 mg total) by mouth 2 (two) times a day., Disp: 60 tablet, Rfl: 11     vit C/E/Zn/coppr/lutein/zeaxan (PRESERVISION AREDS-2 ORAL), Take 1 tablet by mouth 2 (two) times a day., Disp: , Rfl:     Documentation Date:8/26/2019 11:13 AM  Linda Daugherty RN

## 2021-05-31 NOTE — TELEPHONE ENCOUNTER
Phone call to patient to review increase in Omeprazole to 20 mg two times a day per Dr. Strong, 4 weeks prior to her PVI on 10-1-19 and for 4 weeks post.  Pt states understanding, information also sent to patient via letter.  Reviewed contact information for further questions.

## 2021-06-01 NOTE — ANESTHESIA PREPROCEDURE EVALUATION
Anesthesia Evaluation      Patient summary reviewed   No history of anesthetic complications     Airway   Mallampati: II   Pulmonary - normal exam                          Cardiovascular - normal exam  (+) hypertension, ,     ECG reviewed        Neuro/Psych      Endo/Other       GI/Hepatic/Renal    (+) GERD,             Dental - normal exam                        Anesthesia Plan  Planned anesthetic: general endotracheal    ASA 2   Induction: intravenous   Anesthetic plan and risks discussed with: patient  Anesthesia plan special considerations: antiemetics,   Post-op plan: routine recovery

## 2021-06-01 NOTE — ANESTHESIA PROCEDURE NOTES
Arterial Line  Reason for Procedure: hemodynamic monitoring  Patient location during procedure: OR pre-induction  Start time: 10/1/2019 7:34 AM  End time: 10/1/2019 7:53 AM  Staffing:  Performing  Anesthesiologist: Federica Pino MD  Performing CRNA: Isauro Kumar CRNA  Sterile Precautions:  sterile barriers used during insertion: cap, mask, sterile gloves, large sheet, and hand hygiene used.  Arterial Line:   Immediately prior to procedure a time out was called to verify the correct patient, procedure, equipment, support staff and site/side marked as required  Laterality: left  Location: brachial  Prepped with: ChloroPrep    Needle gauge: 20 G  Number of Attempts: 1  Secured with: tape, transparent dressing and pressure dressing  Flushed with: saline  1% lidocaine local anesthesia used for skin prep.   See MAR for additional medications given.  Ultrasound evaluation of access site: yes  Vessel patent by US exam    Concurrent real time visualization of needle entry

## 2021-06-01 NOTE — PATIENT INSTRUCTIONS - HE
Estrellita Herring,    It was a pleasure to see you today at the [facility].     My recommendations after this visit include:    On 9/29/19 stop sotalol (last dose 9/28 PM) and start metoprolol succinate 25 mg daily, take at night.    Ablation with Dr Strong on 10/1/2019    Post-procedure check on 10/4/95487  Follow up with me 6 weeks after ablation  Follow up with Dr Strong 3 months after ablation    Ping Raya, [facility], Electrophysiology  [phone]  EP nurses [phone]

## 2021-06-01 NOTE — ANESTHESIA CARE TRANSFER NOTE
Last vitals:   Vitals:    10/01/19 1221   BP: 157/74   Pulse: 76   Resp: 16   Temp: 36.4  C (97.5  F)   SpO2: 100%     Patient's level of consciousness is drowsy  Spontaneous respirations: yes  Maintains airway independently: yes  Dentition unchanged: yes  Oropharynx: oropharynx clear of all foreign objects    QCDR Measures:  ASA# 20 - Surgical Safety Checklist: WHO surgical safety checklist completed prior to induction    PQRS# 430 - Adult PONV Prevention: 4558F - Pt received => 2 anti-emetic agents (different classes) preop & intraop  ASA# 8 - Peds PONV Prevention: NA - Not pediatric patient, not GA or 2 or more risk factors NOT present  PQRS# 424 - Carina-op Temp Management: 4559F - At least one body temp DOCUMENTED => 35.5C or 95.9F within required timeframe  PQRS# 426 - PACU Transfer Protocol:NA - Patient did not go to PACU  ASA# 14 - Acute Post-op Pain: ASA14B - Patient did NOT experience pain >= 7 out of 10

## 2021-06-01 NOTE — ANESTHESIA POSTPROCEDURE EVALUATION
Patient: Estrellita Herring  EP Ablation PVI - 530AM ADMIT, GEN ANES - WHOLE CASE, NO DEV, CARTO - NOTIFIED 8/27, 4HRS W/ICE, H&P - 9/24, TEACH - EP RN  Anesthesia type: general    Patient location: Telemetry/Step Down Unit  Last vitals:   Vitals Value Taken Time   /69 10/1/2019  4:27 PM   Temp 36.4  C (97.5  F) 10/1/2019  3:30 PM   Pulse 73 10/1/2019  4:32 PM   Resp 28 10/1/2019  3:30 PM   SpO2 98 % 10/1/2019  4:28 PM   Vitals shown include unvalidated device data.  Post vital signs: stable  Level of consciousness: awake and responds to simple questions  Post-anesthesia pain: pain controlled  Post-anesthesia nausea and vomiting: no  Pulmonary: unassisted, return to baseline  Cardiovascular: stable and blood pressure at baseline  Hydration: adequate  Anesthetic events: no    QCDR Measures:  ASA# 11 - Carina-op Cardiac Arrest: ASA11B - Patient did NOT experience unanticipated cardiac arrest  ASA# 12 - Carina-op Mortality Rate: ASA12B - Patient did NOT die  ASA# 13 - PACU Re-Intubation Rate: ASA13B - Patient did NOT require a new airway mgmt  ASA# 10 - Composite Anes Safety: ASA10A - No serious adverse event    Additional Notes:

## 2021-06-02 VITALS — WEIGHT: 152 LBS | HEIGHT: 65 IN | BODY MASS INDEX: 25.33 KG/M2

## 2021-06-02 VITALS — WEIGHT: 152 LBS | BODY MASS INDEX: 25.33 KG/M2 | HEIGHT: 65 IN

## 2021-06-02 VITALS — BODY MASS INDEX: 25.27 KG/M2 | HEIGHT: 65 IN | WEIGHT: 151.7 LBS

## 2021-06-02 VITALS — BODY MASS INDEX: 24.16 KG/M2 | WEIGHT: 145 LBS | HEIGHT: 65 IN

## 2021-06-02 VITALS — HEIGHT: 65 IN | WEIGHT: 150.7 LBS | BODY MASS INDEX: 25.11 KG/M2

## 2021-06-02 VITALS — HEIGHT: 65 IN | WEIGHT: 148 LBS | BODY MASS INDEX: 24.66 KG/M2

## 2021-06-02 NOTE — PATIENT INSTRUCTIONS - HE
Your anticoagulation medication Eliquis:    It is important to remain on your anticoagulation medication uninterrupted after your ablation to reduce your risk of a stroke or heart attack, do not stop this medication    Please remain on your aspirin for 1 month, then you can stop    Healing from your pulmonary vein ablation:    Stay well hydrated, and increase your fluid intake during this recovery period    High protein foods aide in your bodies healing process    No aggressive or aerobic activity for 7-10 days, and do not lift more than 10 pounds for 7 days     Increase your activity gradually over the next 5-10 days, working back to your normal daily activity    Please call me if any of the following occur:    Episodes of Atrial Fibrillation lasting greater than 4 hours, or if you notice the episodes are increasing in frequency or duration    If you develop shortness of breath, dizziness, or unresolving chest pains     Changes at your groin sites including swelling, hardening, drainage, increase in bruising, or an increase in pain    If you develop a temperature greater than 100.5 degrees (especially weeks 2-5 post     Procedure)      Call 911 if you are having symptoms of a stroke; difficulty with your speech, problems walking, difficulty with balance, vision disturbances, facial drooping or numbness, and muscle weakness on one side of your body     Your follow up appointments are as follows:    You will be seen by the electrophysiologist nurse practitioner in 6 weeks    You will have a 14 day ambulatory cardiac monitor in 3 months to assess your rhythm, this will be scheduled at your appointment with the electrophysiologist nurse practitioner    You will be seen by the electrophysiologist nurse practitioner again in 3 months    Sincerely,  Gricelda Adams RN [phone]    After hours please contact the on call service at # [phone]

## 2021-06-02 NOTE — PROGRESS NOTES
Post Procedural PVI Follow Up Visit    Pt is seen in clinic today status post PVI with Dr Strong on 10/1/19.     General Assessment:   Pts vital signs stable, weight compared to pre procedural weight and is stable, lung sounds clear, heart rate regular, heart sounds S1 and S2 present, palpable radial and pedal pulses and no edema/fluid retention noted.   Pt denies generalized or localized pain abnormal to healing s/p, difficulty swallowing, SOB, thoat pain, heart burn, chest pain, urinary retention/difficulty and s/s of infection.  Pt is tolerating advancement in activity well, staying well hydrated, has a good appetite, eating well balanced meals and gradually working into baseline activity.     Pt has notable hematoma, at left arm AC site. This was noted post PVI by hospital staff. Pt reports swelling, inflammation, pain, and bruising has gone down since d/c. Pt still c/o slight pain at AC site, bruising still noted from mid bicep extending into the mid forearm. Pt continues to ice, rest, and elevate her arm which has significantly improved this. Pt denies s/s of infection. Encouraged pt to continue with ice, rest, elevation until pain and swelling resolve. Reviewed with pt when to contact the clinic with changes in the site.    BMP and CBC also were drawn at apt, which were reviewed and when compared to previous lab work was stable. Labs will be sent to Dr Strong for review.    Rhythm Assessment:   Pt denies palpitations, irregularities in HR or rhythm and symptoms or sustained AF episodes.    Procedure Site Assessment:   Pts right groin has 3 puncture sites, is C/D/I, no drainage, small amount of bruising noted around puncture sites, 3 sutures in place, sutures removed, groin is soft, and pt denies pain at the site. Left groin has 1 puncture sites, is C/D/I, no drainage, small amount of amount of bruising noted around puncture site, 1 suture in place, sutures removed, groin is soft, and pt denies pain at the  site. No bruits we heard bilaterally, and pt has strong bilateral femoral and pedal pulses present. All puncture sites were left open to air.    Anticoagulation/Medication:  Pt was instructed to remain on Eliquis without interruption until seen for 3mo follow-up, for further instructions/managment.  Reviewed with pt importance of anticoagulation s/p PVI, reviewed with pt s/s of bleeding while on anticoagulation s/p PVI and encouraged to call with any questions or concerns about anticoagulants  Pt will continue ASA 81mg Daily for 1 mo s/p PVI    Education completed with pt at this visit:  Reviewed post-op PVI healing process, follow up office visit requirements, physical restrictions, nutritional requirements, when to contact EP-RN/EP-MD, contact information was given to the pt for further concerns or questions and pt verbalized understanding     Pt reports doing very well, dispite slight pain at groin sites and left AC site. She reports recovering very well and feels well.    10/4/2019 11:35 AM  Gricelda Adams RN

## 2021-06-03 VITALS
HEIGHT: 65 IN | DIASTOLIC BLOOD PRESSURE: 70 MMHG | BODY MASS INDEX: 24.66 KG/M2 | HEART RATE: 60 BPM | SYSTOLIC BLOOD PRESSURE: 112 MMHG | RESPIRATION RATE: 14 BRPM | WEIGHT: 148 LBS

## 2021-06-03 VITALS — HEIGHT: 65 IN | BODY MASS INDEX: 24.87 KG/M2 | WEIGHT: 149.3 LBS

## 2021-06-03 VITALS
SYSTOLIC BLOOD PRESSURE: 130 MMHG | HEIGHT: 65 IN | RESPIRATION RATE: 20 BRPM | HEART RATE: 72 BPM | DIASTOLIC BLOOD PRESSURE: 62 MMHG | WEIGHT: 148 LBS | BODY MASS INDEX: 24.66 KG/M2

## 2021-06-03 VITALS — BODY MASS INDEX: 25.01 KG/M2 | WEIGHT: 150.13 LBS | HEIGHT: 65 IN

## 2021-06-03 VITALS
RESPIRATION RATE: 16 BRPM | DIASTOLIC BLOOD PRESSURE: 70 MMHG | SYSTOLIC BLOOD PRESSURE: 110 MMHG | HEIGHT: 65 IN | WEIGHT: 149.3 LBS | TEMPERATURE: 97.6 F | BODY MASS INDEX: 24.87 KG/M2 | HEART RATE: 72 BPM

## 2021-06-03 VITALS — HEIGHT: 65 IN | WEIGHT: 148 LBS | BODY MASS INDEX: 24.66 KG/M2

## 2021-06-03 NOTE — PATIENT INSTRUCTIONS - HE
Estrellita Herring,    It was a pleasure to see you today at the [facility].     My recommendations after this visit include:    Continue sotalol 80 mg twice daily until 12/1/2019, then stop taking and restart metoprolol succinate 25 mg daily.  Wear 2 week heart monitor after you stop taking sotalol    Follow up with Dr Strong 3 months post-ablation    Ping Raya, [facility], Electrophysiology  [phone]  EP nurses [phone]

## 2021-06-04 NOTE — TELEPHONE ENCOUNTER
----- Message from Alton Carlson sent at 12/6/2019  3:59 PM CST -----  Regarding: Pt having issues with a LE monitor and question on medications  General phone call:    Caller: Pt    Primary cardiologist: Ping CHEATHAM     Detailed reason for call: Pt states she is having problems with her LE monitor she's wearing -- also Pt said she has a question regarding her medication and would like a call back please    New or active symptoms? Na    Best phone number: [phone]   Best time to contact: any    Ok to leave a detailed message? yes  Device? no    Additional Info:

## 2021-06-04 NOTE — TELEPHONE ENCOUNTER
Patient encouraged to call the monitor company to help troubleshoot issues.  Medication questions were answered to her satisfaction.  TONG

## 2021-06-05 ENCOUNTER — RECORDS - HEALTHEAST (OUTPATIENT)
Dept: EMERGENCY MEDICINE | Facility: CLINIC | Age: 81
End: 2021-06-05

## 2021-06-05 VITALS
OXYGEN SATURATION: 98 % | BODY MASS INDEX: 23.96 KG/M2 | SYSTOLIC BLOOD PRESSURE: 150 MMHG | WEIGHT: 144 LBS | DIASTOLIC BLOOD PRESSURE: 76 MMHG | HEART RATE: 84 BPM

## 2021-06-07 NOTE — TELEPHONE ENCOUNTER
----- Message from Crystal Lange sent at 5/4/2020  1:14 PM CDT -----      Caller: patient  Primary cardiologist: Ping Raya    Detailed reason for call: Patient is itchy all over and she is wondering if she is allergic to the apixaban (ELIQUIS) 5 mg Tab tablet. Please advise    Best phone number: [phone]  Best time to contact: today  Ok to leave a detailed message? yes  Device? no

## 2021-06-07 NOTE — TELEPHONE ENCOUNTER
Return call to patient.  She states that she has episodes of intense itching all over her body with no rash, she states that it happens most afternoon and lasts till the evening and then goes away.  She wonders if it is from the Eliquis, as this is a possible side effect listed.  She has been taking Eliquis for over a year now.  Explained that it is likely not a medication reaction at this point and that she should make an apt to see her PMD for further evaluation.  She states understanding, all questions answered.

## 2021-06-12 NOTE — PATIENT INSTRUCTIONS - HE
Estrellita Herring,    It was a pleasure to see you today at the [facility].     My recommendations after this visit include:    Continue current medications.    Consider left atrial appendage closure with the Watchman device as an alternative to Eliquis    Schedule appt with Dr. Quintero before you go to [place]    Follow up in 1 year, or sooner as needed    Ping Raya, [facility], Electrophysiology  [phone]  EP nurses [phone]

## 2021-06-13 NOTE — TELEPHONE ENCOUNTER
Spoke with pt today. She hasn't given it much thought yet. She's heading to [place] for the winter and has asked for me to call her back in April to discuss further .

## 2021-06-16 PROBLEM — M43.20 ANKYLOSIS OF SPINE: Status: ACTIVE | Noted: 2018-11-09

## 2021-06-16 PROBLEM — I10 ESSENTIAL HYPERTENSION: Status: ACTIVE | Noted: 2018-11-09

## 2021-06-16 PROBLEM — Z98.890 STATUS POST CATHETER ABLATION OF ATRIAL FIBRILLATION: Status: ACTIVE | Noted: 2019-10-01

## 2021-06-16 PROBLEM — I48.0 PAROXYSMAL ATRIAL FIBRILLATION (H): Status: ACTIVE | Noted: 2019-08-27

## 2021-06-16 PROBLEM — K21.9 GASTROESOPHAGEAL REFLUX DISEASE: Status: ACTIVE | Noted: 2018-11-09

## 2021-06-17 NOTE — TELEPHONE ENCOUNTER
Call to patient to discuss whether or not she would like to move forward with LAAC. She is still undecided, would like to discuss with family. Provided her with phone number for structural heart coordinator, encouraged her to call with questions or concerns. Offered to call her back in the future to check in, she declined and stated she will contact the Heart Clinic when she is ready. No further questions at this time. Understanding verbalized. TASHI

## 2021-06-17 NOTE — TELEPHONE ENCOUNTER
Telephone Encounter by Amanda Back RN at 11/18/2020  2:23 PM     Author: Amanda Back RN Service: -- Author Type: Registered Nurse    Filed: 11/18/2020  2:25 PM Encounter Date: 11/18/2020 Status: Signed    : Amanda Back RN (Registered Nurse)     Summary: LAAC candidate/referral      Ping Raya, Amanda Cullen RN             May be interested in LAAC.  Please call in the next week to follow up.   Ping Daniel

## 2021-06-19 NOTE — LETTER
Letter by Gricelda Adams RN at      Author: Gricelda Adams RN Service: -- Author Type: --    Filed:  Encounter Date: 9/24/2019 Status: Signed       IMPORTANT INFORMATION REGARDING YOUR      PULMONARY VEIN ISOLATION ABLATION       Pulmonary Vein Isolation Ablation: Tuesday 10-1-19 with Dr Strong      Please arrive at 5:30 am for registration and prep.    Your procedure is scheduled for 8:00 am.    Have nothing to eat or drink after midnight the night prior to your ablation.    Please DO NOT take any of your medication the morning of your procedure EXCEPT your Eliquis the morning of your ablation.    You will have general anesthesia for the procedure and need to lay flat for 3-4 hours after the procedure.    You will have a garcia catheter placed in your bladder prior to the procedure.  Please let us know if you have had difficulty with a garcia catheter in the past.    If you use a CPAP, please bring this with you to the hospital.    You will stay over night for an Observation stay.    This procedure requires you to spend the night in the hospital overnight for observation. The hospital staff have asked that you arrange for your family/health  to be present at the hospital by 9:00 AM the following day to review your discharge instructions and transport you home from the hospital. Their goal is to have you discharged from the hospital by around 10:00AM    Anticoagulation:    It is important to remain on your anticoagulation medication (Eliquis) uninterrupted before and after your ablation, PLEASE DO NOT STOP THIS MEDICATION or skip any doses.    If you have any questions regarding your medication prior to your procedure please contact me at the number listed below.    Medication changes:      Please increase your Prilosec (Omeprazole) to 20 mg twice daily 4 weeks prior to your procedure, on Tuesday 10-1-19.  Please let us know if you need a new prescription.    Continue taking your Sotalol through  Saturday 9/28/19, then stop. Start taking your Metoprolol 25mg Daily on Sunday 9/29/19.    Postoperative Information :      Please plan on taking 5-7 days after the procedure for recuperation. We ask that you do not drive until you are seen in the clinic for your post op follow up.  This is to aide in the healing of your groin sites.  This is scheduled on Friday 10-4-19 @ 10:30, the [facility].    Detailed information regarding discharge instructions will be given to you when you leave the hospital.      Follow up:    After the ablation you will be scheduled to see:      EP Nurse Clinician for an assessment and suture removal, if appropriate.    EP Nurse Practitioner in 4-6 weeks;  A 2 week heart monitor will be ordered for you to wear about 8 weeks after the ablation.    EP Nurse Practitioner/Electrophysiologist 3 months after the ablation.      Travel Restrictions following procedure :      No travel by plane for 4 weeks.    Please refrain from extended travel outside the [place] for 3 weeks.    Contact the clinic for questions.    Parking information :      Please arrive at [facility], located at: [address] [place] [address]      Parking is available at the [address] [place] [address] entrance, and is open at 5:00 am.    If you park in the ramp located on [address] [place] [address], take the elevator to Lobby/level one.     Enter the doors to the atrium/lobby area and check in at the main reception desk.    You will be assisted to Cardiac Special Care on the third floor.     Ramp parking will be free the day of your procedure and reduced to $4.00 for each visit after.      Please ask at the main reception desk in the lobby or on the third floor for parking validation.    Contact Information :      Please call with any questions or concerns regarding the procedure:Linda Daugherty [phone]  Please call with any questions or concerns regarding the procedure.     Scheduling questions or concerns: Chelo  Marilee [phone].                        Thank you for choosing [facility].

## 2021-06-19 NOTE — LETTER
Letter by Linda Daugherty RN at      Author: Linda Daugherty RN Service: -- Author Type: --    Filed:  Encounter Date: 10/4/2019 Status: Signed         Estrellita Herring  [address]  [facility] [address]             October 4, 2019         Dear Ms. Herring,    Below are the results from your recent visit:    Resulted Orders   Basic metabolic panel   Result Value Ref Range    Sodium 137 136 - 145 mmol/L    Potassium 4.7 3.5 - 5.0 mmol/L    Chloride 104 98 - 107 mmol/L    CO2 28 22 - 31 mmol/L    Anion Gap, Calculation 5 5 - 18 mmol/L    Glucose 99 70 - 125 mg/dL    Calcium 9.7 8.5 - 10.5 mg/dL    BUN 13 8 - 28 mg/dL    Creatinine 0.73 0.60 - 1.10 mg/dL    GFR MDRD Af Amer >60 >60 mL/min/1.73m2    GFR MDRD Non Af Amer >60 >60 mL/min/1.73m2    Narrative    Fasting Glucose reference range is 70-99 mg/dL per  American Diabetes Association (ADA) guidelines.   HM2(CBC w/o Differential)   Result Value Ref Range    WBC 8.1 4.0 - 11.0 thou/uL    RBC 3.81 3.80 - 5.40 mill/uL    Hemoglobin 9.4 (L) 12.0 - 16.0 g/dL    Hematocrit 31.8 (L) 35.0 - 47.0 %    MCV 84 80 - 100 fL    MCH 24.7 (L) 27.0 - 34.0 pg    MCHC 29.6 (L) 32.0 - 36.0 g/dL    RDW      Platelets 285 140 - 440 thou/uL    MPV 10.9 8.5 - 12.5 fL     The test results show that your current treatment is working.     Please call with questions or contact us using [employer].    Sincerely,        Electronically signed by Linda Daugherty RN

## 2021-06-19 NOTE — LETTER
Letter by Linda Daugherty RN at      Author: Linda Daugherty RN Service: -- Author Type: --    Filed:  Encounter Date: 8/28/2019 Status: (Other)       Estrellita Herring  [address] [facility] [address]        IMPORTANT INFORMATION REGARDING YOUR      PULMONARY VEIN ISOLATION ABLATION       Preoperative Physical : Tuesday 9-24-19      Your pre operative physical is scheduled with our EP Nurse Practitioner  Ping Raya .     Please bring these instructions with you to your appointment.    You will meet with a Nurse for education after your visit with the EP Nurse Practitioner    Pre procedure Cardiac CT/MRI :      Used to obtain images of your Pulmonary Veins prior to the procedure to assist in mapping your heart during the ablation.    A  will be calling you to set up a time for the test.    The CT or MRI should be completed at least 2 weeks prior to your ablation.      Pulmonary Vein Isolation Ablation: Tuesday 10-1-19 with Dr Strong      Please arrive at 5:30 am for registration and prep.    Your procedure is scheduled for 8:00 am.    Have nothing to eat or drink after midnight the night prior to your ablation.    Please DO NOT take any of your medication the morning of your procedure EXCEPT your Eliquis the morning of your ablation.    You will have general anesthesia for the procedure and need to lay flat for 3-4 hours after the procedure.    You will have a garcia catheter placed in your bladder prior to the procedure.  Please let us know if you have had difficulty with a garcia catheter in the past.    If you use a CPAP, please bring this with you to the hospital.    You will stay over night for an Observation stay.    This procedure requires you to spend the night in the hospital overnight for observation. The hospital staff have asked that you arrange for your family/health  to be present at the hospital by 9:00 AM the following day to review your discharge instructions and  transport you home from the hospital. Their goal is to have you discharged from the hospital by around 10:00AM    Anticoagulation:    It is important to remain on your anticoagulation medication (Eliquis) uninterrupted before and after your ablation, PLEASE DO NOT STOP THIS MEDICATION or skip any doses.    If you have any questions regarding your medication prior to your procedure please contact me at the number listed below.    Medication changes:      Please increase your Prilosec (Omeprazole) to 20 mg twice daily 4 weeks prior to your procedure, on Tuesday 10-1-19.  Please let us know if you need a new prescription.    Postoperative Information :      Please plan on taking 5-7 days after the procedure for recuperation. We ask that you do not drive until you are seen in the clinic for your post op follow up.  This is to aide in the healing of your groin sites.  This is scheduled on Friday 10-4-19 @ 10:30, the [facility].    Detailed information regarding discharge instructions will be given to you when you leave the hospital.      Follow up:    After the ablation you will be scheduled to see:      EP Nurse Clinician for an assessment and suture removal, if appropriate.    EP Nurse Practitioner in 4-6 weeks;  A 2 week heart monitor will be ordered for you to wear about 8 weeks after the ablation.    EP Nurse Practitioner/Electrophysiologist 3 months after the ablation.      Travel Restrictions following procedure :      No travel by plane for 4 weeks.    Please refrain from extended travel outside the [place] for 3 weeks.    Contact the clinic for questions.    Parking information :      Please arrive at [facility], located at: [address] [place] [address]      Parking is available at the [address] [place] [address] entrance, and is open at 5:00 am.    If you park in the ramp located on [facility] [address], take the elevator to Lobby/level one.     Enter the doors to the atrium/lobby area and check in at  the main reception desk.    You will be assisted to Cardiac Special Care on the third floor.     Ramp parking will be free the day of your procedure and reduced to $4.00 for each visit after.      Please ask at the main reception desk in the lobby or on the third floor for parking validation.    Contact Information :      Please call with any questions or concerns regarding the procedure:Linda Daugherty [phone]  Please call with any questions or concerns regarding the procedure.     Scheduling questions or concerns: Chelo Hunt [phone].                        Thank you for choosing [facility].                  Information on Atrial Fibrillation Ablations    What is Catheter Ablation?             A Catheter Ablation is a procedure that treats certain types of abnormal heart rhythms (arrhythmia). There are several components to the procedure, but the final purpose is target and destroy (ablate) small areas of your heart muscle that are causing the arrhythmia.     Why is an Ablations Done?  A catheter ablation is an effective way to treat some types of abnormal heart rhythms. An ablation is a relatively low risk procedure that may permanently cure your abnormal heart rhythm.  The ablation process damages the heart cells which results in scarring of that area. The scar is electrically inactive and can produce a permanent cure for the abnormal rhythm.  Ablation procedures can help avoid the need for rhythm medications and give patients the ability to return to their normal activity and live an active life. In patients that do not have symptoms, ablations are not typically done as there may still be an increased risk of stroke.    Why is Catheter Ablation Done?  Sometimes, the hearts electrical system does not work properly.  This can cause abnormal heart rhythms, called arrhythmias.  During an arrhythmia, the heart may beat too fast, too slowly, or irregularly.  Your doctor has recommended  catheter ablation to treat a rapid (fast) heart rhythm, or tachycardia.      How Catheter Ablation Is Done  Catheter ablation uses thin, flexible wires called electrode catheters to find and destroy (ablate) problem cells. Here is how the procedure is done:    The pulmonary veins will be treated first. There are currently two tools used to ablate around the pulmonary veins.  1) Radiofrequency catheter will heat the tissue.  2)  Cryo-balloon catheter will freeze the tissue.       Testing will be done to confirm that effective treatment has been delivered.       Further testing may be performed to see if a fib is still present or if some other rhythm problem such as atrial flutter is present. If an ongoing rhythm problem is discovered then further ablation can be done to isolate and destroy those areas responsible for the arrhythmia.       Your Experience during Catheter Ablation  In most cases, catheter ablations are done in an electrophysiology (EP) lab. Depending on your arrhythmia it often takes 4-6 hours, and sometimes longer.     The procedural area: You will be transferred back to the procedure room once you have been appropriately prepped by the nursing staff_ and you are ready for your ablation.     Sedation: You to be put completely asleep for your ablation using general anesthesia.    While you are asleep a bladder catheter will be inserted. This will be removed after your bedrest is complete.    Inserting the catheters: You will have 3-4 catheters inserted into the veins. Catheter locations can include the shoulder, neck, and groins. Catheters are guided to the heart with the help of x-ray monitors.    Finishing up: When the procedure is finished, the catheters are taken out of your body. A special closure device may be used to help seal these puncture sites. Youre then taken to your room to rest, and will be cared for by an intensive care unit (ICU) nurse.      Risks and Complications  The risks of  catheter ablation are fairly low compared to the benefits you receive. Possible risks and complications include:    Common (up to 10%)  o Bleeding or bruising  o Shortness of breath  o Heartburn    Uncommon (< 1%)  o Blood clots  o A slow heart rhythm (requiring a permanent pacemaker)  o Perforation of the heart muscle, blood vessel, or lung (may require an emergency procedure)  o Stroke  o Damage to a heart valve   o Heart attack, also known as acute myocardial infarction, or AMI   o Death (extremely rare)    Before your Catheter Ablation  Before your catheter ablation, you will meet with the electrophysiologist (specially trained heart doctor) who will do the procedure. The provider or a registered nurse will provide you with detailed instructions on how to prepare for this procedure, some of these instructions are listed below.    You will likely be told to stop or change your heart rhythm medications for a period of time before your ablation.     You may have testing done several days prior to your ablation or the morning of your ablation, such as an ECG, x-ray, blood tests, or echocardiogram.     You will not be allowed to eat or drink 8 hours before your ablation. You will be given further instructions by your physician or a registered nurse regarding the medication you will take the morning of your procedure.    You will need to arrange to have a  home from the hospital; you will not be permitted to drive after your procedure due to the sedation that you receive.     You are allowed to bring personal items and clothing to the hospital, but please refrain from bringing any valuables as the hospital is not responsible for any lost or stolen items.    You will need to bring a list of the names and dosages of the medication you are taking to the hospital.    It is important to mention to your doctor or registered nurse if you have any allergies, reactions to anesthesia, or have had history of bleeding  problems.     Arriving at the Hospital the morning of your Catheter Ablation  You will need to check in at the 1st floor entrance at the [facility] the morning of your ablation, you will then be escorted to the 3rd floor where they will prepare you for your ablation and where your ablation will be performed.    When you arrive on the floor the doctor or registered nurse will meet with you prior to your ablation, this is a good time to ask questions and address any concerns you may have. You will then be asked to sign the consent form for your ablation, if this has not already been done.    The nursing staff will begin to prepare you for your procedure:    A nurse will shave and cleanse the area where the ablation catheters will be placed. These areas are most commonly the left and right groin sites (the fold between your thigh and abdomen), and in some cases the chest, arm, and neck. This is done to reduce the risk of infection.      The nursing staff will start an intravenous (IV) line into a vein in your arm, which allows the staff to give you medication and sedatives to help you relax prior and during your ablation.    In some cases, the nursing staff will need to place a catheter that will drain urine from your bladder (Pichardo Catheter), which is required due to the length and complexity of the ablation you are having.    After Catheter Ablation  Recovery immediately after your ablation in the hospital  After your catheter ablation procedure, you will be taken to a recovery room. You may need to lie flat for 2-6 hours while the insertion sites close up. During this time, a nurse will monitor you, and you will be given medication to make you comfortable. This ablation requires you to stay in the hospital overnight.    Going Home  When it is time to go home, your will need to have an adult family member or friend drive you. Most people can walk, climb stairs, and perform light activity soon  after catheter ablation. You can most likely return to your full routine within a few days. However, you may be told to avoid running, heavy lifting, and other strenuous activities for a short time. Please make sure to follow any specific activity restrictions provided by the medical staff at the time of your discharge from the hospital.    Doctor's typically advise that you not drive until your post procedure assessment visit in the clinic.     Avoid heavy physical activity and heavy lifting for several days after the procedure to allow your body to heal.    Ask your doctor when you can expect to return to work.    Take your temperature and check your incision for signs of infection (redness, swelling, drainage, or warmth) every day for a week. It is normal to have a small bruise or lump where the catheter was inserted.    Take your medications exactly as directed. Do not skip doses or stop medication without consulting your physician prior.    Learn to take your own pulse and keep a record of your results.    Follow-Up  After your ablations you will have a follow up visit to see how you are doing, to assess your rhythm after your ablation, and to address any medication changes if necessary. In many cases, one ablation is enough to treat an arrhythmia. However, sometimes the problem returns or another is found. If this happens, you may need a second catheter ablation. Tell your healthcare provider if you have any new or returning symptoms.    Common Symptoms after Catheter Ablation  In the first few weeks after catheter ablation, you may feel mild chest fullness or aching. You may also feel as if your heart is skipping beats or your heartbeat may feel faster than normal. You may think that your heart rhythm problem is about to return. These sensations are normal and usually go away with time. Talk to your healthcare provider if you are concerned.    When to Call Your Doctor    Increased bleeding, bruising, or pain  at the insertion site    Episodes of atrial fibrillation are common post procedure, call the clinic if episodes are lasting longer than 4-6 hours    Difficulty with your speech or walking, or any visual disturbance    Lightheaded, dizziness, or feeling faint    Shortness of breath or chest pain    Coldness, swelling, or numbness of the arm or leg near the insertion site    A bruise or lump at the insertion site that is larger than a walnut    A fever over 100 F

## 2021-06-21 NOTE — PROGRESS NOTES
[facility] Consultation Note    Thank you, Dr. Chai Quintero MD, for asking the [facility] team to see Estrellita Herring in consultation today at our clinic to evaluate palpitations.      Assessment:   1.  Palpitations doubt any significant underlying cardiac arrhythmia  2.  Essential hypertension controlled     Plan:   We will have all echocardiogram performed to rule out structural heart disease.  Will have an event monitor placed for 3 weeks to exclude any significant underlying arrhythmias.  I have not made any changes in Estrellita's current medical regiment.  We will have the results of these tests handled by telephone            Current History:   78-year-old female without any significant past cardiac history.  Patient has episodic irregular heartbeat and brief tachypalpitations off and on for the last year.  These generally occur at night without other associated symptoms.  She denies any history of syncope or near syncope.  She denies any chest pain or change in exercise tolerance.  She had an echocardiogram done 5 years ago that was essentially normal with no other cardiac testing reported    Past Medical History:   No past medical history on file.  Essential hypertension, gastroesophageal reflux disease and alkalosis and spondylitis    Past Surgical History:   No past surgical history on file.  Partial colectomy for recurrent diverticulitis, left knee replacement and cataract extraction    Family History:   No family history on file.  Father  of metastatic prostate cancer at age 79.  Mother lived to be 94.  She has 1 brother and 4 sisters none of whom have known coronary artery disease    Social History:    reports that she has never smoked. She has never used smokeless tobacco. She reports that she does not use illicit drugs.  Patient is  and lives independently    Meds:   Scheduled Meds:  PRN Meds:.    Allergies:   Sulfa (sulfonamide antibiotics)    Review of  "Systems:   General: WNL  Eyes: WNL  Ears/Nose/Throat: WNL  Lungs: WNL  Heart: Irregular Heartbeat  Stomach: WNL  Bladder: WNL  Muscle/Joints: WNL  Skin: WNL  Nervous System: Dizziness  Mental Health: WNL     Blood: WNL      Objective:      Physical Exam  152 lb (68.9 kg)  5' 5\" (1.651 m)  Body mass index is 25.29 kg/(m^2).  /60 (Patient Site: Right Arm, Patient Position: Sitting, Cuff Size: Adult Regular)  Pulse 72  Resp 16  Ht 5' 5\" (1.651 m)  Wt 152 lb (68.9 kg)  BMI 25.29 kg/m2    General Appearance:   alert, no apparent distress   HEENT:  no scleral icterus; the mucous membranes were pink and moist                                  Neck: jugular venous pressure is normal, no thyromegaly   Chest: the spine was straight and the chest was symmetric   Lungs:   respirations unlabored; the lungs are clear to auscultation   Cardiovascular:   regular rhythm with normal first and second heart sounds and no murmurs, clicks, or gallops. Carotid, radial, femoral, and posterior tibial pulses are intact; there are no carotid or femoral bruits.   Abdomen:  no organomegaly, masses, bruits, or tenderness; bowel sounds are present   Extremities: no cyanosis, clubbing, or edema.  No obvious musculoskeletal abnormalities   Skin: no xanthelasma   Neurologic: mood and affect are appropriate         echocardiogram report from March 2013 was reviewed as above          Lab Review   Lab Results   Component Value Date     05/01/2018    K 4.7 05/01/2018     05/01/2018    CO2 25 05/01/2018    BUN 15 05/01/2018    CREATININE 0.73 05/01/2018    CALCIUM 9.9 05/01/2018     Lab Results   Component Value Date    WBC 5.9 05/27/2014    HGB 9.3 (L) 05/27/2014    HCT 29.0 (L) 05/27/2014    MCV 90 05/27/2014     05/27/2014     Lab Results   Component Value Date    CHOL 180 05/01/2018    TRIG 121 05/01/2018    HDL 42 (L) 05/01/2018         Get Chowdhury M.D., F.A.C.C.  [facility] [place] [facility]  [phone]       "

## 2021-06-22 NOTE — PROGRESS NOTES
Pt comes to clinic today for evaluation of QT after starting Sotalol 80 mg two times a day on Saturday 12-15-18.    EKG shows SB @52,  QTc 441.  Pt states that she is feeling much better since starting Sotalol.  She states she was feeling fluttery all the time and now she is not.  She notes a significant improvement in how she is feeling.  She denies any dizziness or shortness of breath.  Discussed continuing Sotalol and to call with any questions or changes.  She states understanding.  Reviewed contact information for her to call and schedule apt with Ping for when she returns in April.

## 2021-06-22 NOTE — PROGRESS NOTES
EKG reviewed, QTc interval well within safety parameters.  Continue sotalol.  Follow up as planned.

## 2021-06-26 NOTE — PROGRESS NOTES
Progress Notes by Ping Raya CNP at 11/9/2018  1:30 PM     Author: Ping Raya CNP Service: -- Author Type: Nurse Practitioner    Filed: 11/9/2018  3:16 PM Encounter Date: 11/9/2018 Status: Signed    : Ping Raya CNP (Nurse Practitioner)           Click to link to [facility] ELECTROPHYSIOLOGY NOTE      Assessment/Recommendations   Assessment/Plan:    1.  Atrial fibrillation: Newly diagnosed on event monitor, but symptomatic episodes over the past year.  It is not entirely clear from her symptom report today whether she is paroxysmal or persistent.  She was instructed to continue wearing the monitor for the full 3-week duration and to press the button to record any symptoms.  She felt that she converted back to normal rhythm, but with frequent skips during the clinic visit so we also sent that as a manual recording.  We had a lengthy discussion of the physiology and natural progression of atrial fibrillation as well as treatment options of rate control versus rhythm control depending upon the presence of symptoms.  We further discussed rhythm control with medications versus pulmonary vein isolation ablation.  We also discussed the importance of avoiding possible triggers; she was advised to decrease her coffee intake and increase her daily water intake.  Due to the possibility she may be persistent, I do not want to start her on an antiarrhythmic medication until we have documentation that she is paroxysmal or she has been anticoagulated for a full 3 weeks.  Increase metoprolol succinate to 50 mg daily.    She was reassured that atrial fibrillation is not life-threatening, but carries an increased risk for stroke.  She has a [id number] score of 4 for age >75, female gender, and hypertension.  We discussed the risk for stroke with A. fib versus the risk for bleed on oral anticoagulation therapy and the current guidelines which recommend anticoagulation.  We  "discussed the pros and cons of the various agents including Eliquis, Xarelto, Pradaxa, and warfarin.  After discussion, we will start her on Eliquis 5 mg twice daily.  She was given a 30-day free trial card while she looks into her insurance coverage.  She will let me know if we need to switch medications due to insurance coverage.  She was given information on atrial fibrillation and anticoagulation to review at home.    2.  Hypertension: Blood pressure at target today, but should allow for the increase in metoprolol.    Follow up in 5 weeks.     History of Present Illness    Ms. Estrellita Herring is a very pleasant 78 y.o. female who comes in today for EP consultation of atrial fibrillation.  She has been having episodes of an irregular heartbeat associated with fatigue and dyspnea on exertion off and on for the past year.  She reports it started in January or February after having an upper respiratory infection while in [place].  She reports that symptoms are occurring now every couple of weeks, often at night.  She is wearing an event monitor and we received a notification for an episode of atrial fibrillation.  Echocardiogram showed normal cardiac structure and function, TSH was normal.  She does drink \"a lot\" of coffee, no alcohol, and \"a lot\" of water.  She states that earlier today she was having her typical symptoms, though now is feeling better, though she continues to have frequent \"skips\".  I had her send a manual recording her event monitor in an effort to document change in rhythm (this was sent at 2:52 PM).  She denies chest discomfort, abdominal fullness/bloating or peripheral edema, shortness of breath, paroxysmal nocturnal dyspnea, orthopnea, lightheadedness, dizziness, pre-syncope, or syncope.  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    Cardiographics (personally reviewed):  EKG, Done 2/26/2013 shows sinus rhythm at 62 bpm, " QT/QTc interval measures 382/385 ms    Echo done 11/7/2018:    Left ventricle ejection fraction is normal. The calculated left ventricular ejection fraction is 56%.    Normal right ventricular size and systolic function.    No hemodynamically significant valvular heart abnormalities.    No previous study for comparison.  Left atrial volume is mildly increased     Physical Examination Review of Systems   Vitals:    11/09/18 1357   BP: 130/80   Pulse: 82   Resp: 20     Body mass index is 25.24 kg/(m^2).  Wt Readings from Last 3 Encounters:   11/09/18 151 lb 11.2 oz (68.8 kg)   11/07/18 152 lb (68.9 kg)   10/17/18 152 lb (68.9 kg)     General Appearance:   Alert, well-appearing and in no acute distress.   HEENT: Atraumatic, normocephalic.  No scleral icterus, normal conjunctivae, EOM intact; mucous membranes pink and moist.  No carotid bruit or obvious thyromegaly.   Chest: Chest symmetric, spine straight.   Lungs:   Respirations unlabored: Lungs are clear to auscultation.   Cardiovascular:   Normal first and second heart sounds with no murmurs, rubs, or gallops.  Regular rate and rhythm with extrasystole every third beat.  Radial and posterior tibial pulses are intact, no edema.   Abdomen:  Soft, nontender, nondistended, bowel sounds present   Extremities: No cyanosis or clubbing   Musculoskeletal: Moves all extremities   Skin: Warm, dry, intact.    Neurologic: Mood and affect are appropriate, alert and oriented to person, place, time, and situation    General: WNL  Eyes: WNL  Ears/Nose/Throat: WNL  Lungs: WNL  Heart: Irregular Heartbeat (palpations)  Stomach: WNL  Bladder: WNL  Muscle/Joints: WNL  Skin: WNL  Nervous System: Dizziness        Blood: WNL     Medical History  Surgical History Family History Social History   Past Medical History:   Diagnosis Date   ? Ankylosis of spine    ? Diverticulitis    ? Hypertension    ? Iritis     Past Surgical History:   Procedure Laterality Date   ? CATARACT EXTRACTION,  BILATERAL     ? COLECTOMY  2006    partial   ? TOTAL KNEE ARTHROPLASTY Left     Family History   Problem Relation Age of Onset   ? Alzheimer's disease Mother    ? Cancer Father      prostate   ? Atrial fibrillation Sister    ? Diabetes type II Sister     Social History     Social History   ? Marital status:      Spouse name: N/A   ? Number of children: N/A   ? Years of education: N/A     Occupational History   ? Not on file.     Social History Main Topics   ? Smoking status: Never Smoker   ? Smokeless tobacco: Never Used   ? Alcohol use No   ? Drug use: No   ? Sexual activity: Not on file     Other Topics Concern   ? Not on file     Social History Narrative          Medications  Allergies   Current Outpatient Prescriptions   Medication Sig Dispense Refill   ? calcium carbonate/vitamin D3 (CALCIUM+D ORAL) Take 1 tablet by mouth 2 (two) times a day.     ? inFLIXimab (REMICADE) 100 mg injection Infuse 400 mg into a venous catheter every 2 (two) months.     ? metoprolol succinate (TOPROL-XL) 25 MG Take 2 tablets (50 mg total) by mouth daily. 180 tablet 3   ? omeprazole (PRILOSEC) 20 MG capsule Take 20 mg by mouth daily.     ? prednisoLONE acetate (PRED-FORTE) 1 % ophthalmic suspension Administer 1 drop to both eyes every 4 (four) hours.     ? vit C/E/Zn/coppr/lutein/zeaxan (PRESERVISION AREDS-2 ORAL) Take 1 tablet by mouth 2 (two) times a day.     ? apixaban (ELIQUIS) 5 mg Tab tablet Take 1 tablet (5 mg total) by mouth every 12 (twelve) hours. 60 tablet 11     No current facility-administered medications for this visit.       Allergies   Allergen Reactions   ? Sulfa (Sulfonamide Antibiotics) Rash     hallucinations      Medical, surgical, family, social history, and medications were all reviewed and updated as necessary.   Lab Results    Chemistry CBC Other   Lab Results   Component Value Date    CREATININE 0.73 10/22/2018    BUN 14 10/22/2018     10/22/2018    K 4.1 10/22/2018     10/22/2018    CO2  27 10/22/2018     Creatinine (mg/dL)   Date Value   10/22/2018 0.73   05/01/2018 0.73   04/06/2017 0.78   11/25/2015 0.76    Lab Results   Component Value Date    WBC 5.9 05/27/2014    HGB 9.3 (L) 05/27/2014    HCT 29.0 (L) 05/27/2014    MCV 90 05/27/2014     05/27/2014    Lab Results   Component Value Date    INR 1.16 (H) 05/25/2014    INR 1.16 (H) 03/31/2013    INR 1.19 (H) 03/30/2013     Lab Results   Component Value Date    TSH 2.30 10/22/2018            60 minutes were spent face to face in this visit with more than 50% spent discussing diagnoses as listed above, counseling, and coordination of care.    This note has been dictated using voice recognition software. Any grammatical, typographical, or context distortions are unintentional and inherent to the software.    Ping Raya, [facility]   Electrophysiology

## 2021-06-27 NOTE — PROGRESS NOTES
Progress Notes by Ping Raya CNP at 4/23/2019  9:50 AM     Author: Ping Raya CNP Service: -- Author Type: Nurse Practitioner    Filed: 4/23/2019 11:39 AM Encounter Date: 4/23/2019 Status: Signed    : Ping Raya CNP (Nurse Practitioner)           Click to link to [facility]     [facility] HEART [facility] ELECTROPHYSIOLOGY NOTE      Assessment/Recommendations   Assessment/Plan:    1.  Paroxysmal atrial fibrillation: Occasional breakthrough A. fib on low-dose sotalol.  Symptoms of fatigue and dyspnea on exertion are likely related to mild sinus bradycardia and side effects of sotalol.  She was also advised to increase her daily fluid intake to prevent dehydration.  We had a lengthy discussion of the physiology and natural progression of atrial fibrillation as well as treatment options including continuing with sotalol despite side effects, doing a nuclear stress test and switching to flecainide, versus pulmonary vein isolation ablation.  We further discussed the ablation procedure, less than 1% risk for complication, and expected recovery.  She would like to consider her options further before making any decisions.  She was instructed to call if episodes of A. fib and increase in frequency or duration.  Continue sotalol 80 mg twice daily.    She was reassured that atrial fibrillation is not life-threatening, but carries an increased risk for stroke.  She has a [id number] score of 4 for age >75, female gender, and hypertension.  Continue Eliquis 5 mg twice daily for stroke prophylaxis.  She reports no missed doses, side effects, or bleeding issues.    2.  Hypertension: Blood pressure at target today.    Follow up in 3 months.     History of Present Illness    Ms. Estrellita Herring is a very pleasant 78 y.o. female who comes in today for EP follow-up of atrial fibrillation.  She was diagnosed with paroxysmal atrial fibrillation with rapid ventricular response in November 2018, but symptom onset  was almost a year prior.  She is quite symptomatic with A. fib; symptoms consist of septations and fatigue.  She was started on sotalol 80 mg twice daily in December 2018. 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  She remains on Eliquis 5 mg twice daily for stroke prophylaxis.    Estrellita states that overall she feels well, though gets tired and winded with strenuous activity.  She has also had a couple of episodes of hypotension likely related to dehydration.  She reports only a couple of episodes of A. fib since starting sotalol, though the one last week lasted for over a day and really wiped her out.  She denies chest discomfort, abdominal fullness/bloating or peripheral edema, shortness of breath at rest or with normal activity, paroxysmal nocturnal dyspnea, orthopnea, lightheadedness, dizziness, pre-syncope, or syncope.      Cardiographics (personally reviewed):  EKG, Done 12/17/2018 shows sinus bradycardia 52 bpm, QT/QTc interval measures 434/403 ms    Event monitor worn 11/7/18 through 11/27/18 shows sinus rhythm in the 60s-70s with normal electrocardiographic intervals with paroxysmal atrial fibrillation with ventricular rates 100-150 bpm.    Echo done 11/7/2018:    Left ventricle ejection fraction is normal. The calculated left ventricular ejection fraction is 56%.    Normal right ventricular size and systolic function.    No hemodynamically significant valvular heart abnormalities.    No previous study for comparison.  Left atrial volume is mildly increased     Physical Examination Review of Systems   Vitals:    04/23/19 1009   BP: 110/64   Pulse: (!) 58   Resp: 18     Body mass index is 24.13 kg/m .  Wt Readings from Last 3 Encounters:   04/23/19 145 lb (65.8 kg)   12/17/18 148 lb (67.1 kg)   12/14/18 150 lb 11.2 oz (68.4 kg)     General Appearance:   Alert, well-appearing and in no acute distress.   HEENT: Atraumatic, normocephalic.   No scleral icterus, normal conjunctivae, EOM intact; mucous membranes pink and moist.     Chest: Chest symmetric, spine straight.   Lungs:   Respirations unlabored: Lungs are clear to auscultation.   Cardiovascular:   Normal first and second heart sounds with no murmurs, rubs, or gallops.  Regular rate and rhythm.  Radial and posterior tibial pulses are intact, no edema.   Abdomen:  Soft, nontender, nondistended, bowel sounds present   Extremities: No cyanosis or clubbing   Musculoskeletal: Moves all extremities   Skin: Warm, dry, intact.    Neurologic: Mood and affect are appropriate, alert and oriented to person, place, time, and situation    General: WNL  Eyes: WNL  Ears/Nose/Throat: WNL  Lungs: WNL  Heart: Shortness of Breath with activity, Irregular Heartbeat  Stomach: WNL  Bladder: Frequent Urination at Night  Muscle/Joints: WNL  Skin: WNL  Nervous System: WNL  Mental Health: WNL     Blood: WNL     Medical History  Surgical History Family History Social History   Past Medical History:   Diagnosis Date   ? Ankylosis of spine    ? Diverticulitis    ? Hypertension    ? Iritis     Past Surgical History:   Procedure Laterality Date   ? CATARACT EXTRACTION, BILATERAL     ? COLECTOMY  2006    partial   ? TOTAL KNEE ARTHROPLASTY Left     Family History   Problem Relation Age of Onset   ? Alzheimer's disease Mother    ? Cancer Father         prostate   ? Atrial fibrillation Sister    ? Diabetes type II Sister     Social History     Socioeconomic History   ? Marital status:      Spouse name: Not on file   ? Number of children: Not on file   ? Years of education: Not on file   ? Highest education level: Not on file   Occupational History   ? Not on file   Social Needs   ? Financial resource strain: Not on file   ? Food insecurity:     Worry: Not on file     Inability: Not on file   ? Transportation needs:     Medical: Not on file     Non-medical: Not on file   Tobacco Use   ? Smoking status: Never Smoker   ?  Smokeless tobacco: Never Used   Substance and Sexual Activity   ? Alcohol use: No   ? Drug use: No   ? Sexual activity: Not on file   Lifestyle   ? Physical activity:     Days per week: Not on file     Minutes per session: Not on file   ? Stress: Not on file   Relationships   ? Social connections:     Talks on phone: Not on file     Gets together: Not on file     Attends [employer] service: Not on file     Active member of club or organization: Not on file     Attends meetings of clubs or organizations: Not on file     Relationship status: Not on file   ? Intimate partner violence:     Fear of current or ex partner: Not on file     Emotionally abused: Not on file     Physically abused: Not on file     Forced sexual activity: Not on file   Other Topics Concern   ? Not on file   Social History Narrative   ? Not on file          Medications  Allergies   Current Outpatient Medications   Medication Sig Dispense Refill   ? apixaban (ELIQUIS) 5 mg Tab tablet Take 1 tablet (5 mg total) by mouth every 12 (twelve) hours. 60 tablet 11   ? calcium carbonate/vitamin D3 (CALCIUM+D ORAL) Take 1 tablet by mouth 2 (two) times a day.     ? inFLIXimab (REMICADE) 100 mg injection Infuse 400 mg into a venous catheter every 2 (two) months.     ? omeprazole (PRILOSEC) 20 MG capsule Take 20 mg by mouth daily.     ? prednisoLONE acetate (PRED-FORTE) 1 % ophthalmic suspension Administer 1 drop to both eyes every 4 (four) hours.     ? sotalol (BETAPACE) 80 MG tablet Take 1 tablet (80 mg total) by mouth 2 (two) times a day. 60 tablet 11   ? vit C/E/Zn/coppr/lutein/zeaxan (PRESERVISION AREDS-2 ORAL) Take 1 tablet by mouth 2 (two) times a day.       No current facility-administered medications for this visit.       Allergies   Allergen Reactions   ? Sulfa (Sulfonamide Antibiotics) Rash     hallucinations      Medical, surgical, family, social history, and medications were all reviewed and updated as necessary.   Lab Results    Chemistry CBC Other    Lab Results   Component Value Date    CREATININE 0.73 10/22/2018    BUN 14 10/22/2018     10/22/2018    K 4.1 10/22/2018     10/22/2018    CO2 27 10/22/2018     Creatinine (mg/dL)   Date Value   10/22/2018 0.73   05/01/2018 0.73   04/06/2017 0.78   11/25/2015 0.76    Lab Results   Component Value Date    WBC 5.9 05/27/2014    HGB 9.3 (L) 05/27/2014    HCT 29.0 (L) 05/27/2014    MCV 90 05/27/2014     05/27/2014    Lab Results   Component Value Date    INR 1.16 (H) 05/25/2014    INR 1.16 (H) 03/31/2013    INR 1.19 (H) 03/30/2013     Lab Results   Component Value Date    TSH 2.30 10/22/2018              This note has been dictated using voice recognition software. Any grammatical, typographical, or context distortions are unintentional and inherent to the software.    Ping Raya, Replaced by [facility]   Electrophysiology

## 2021-06-27 NOTE — PROGRESS NOTES
Progress Notes by Ping Raya CNP at 7/29/2019  9:50 AM     Author: Ping Raya CNP Service: -- Author Type: Nurse Practitioner    Filed: 7/29/2019 10:37 AM Encounter Date: 7/29/2019 Status: Signed    : Ping Raya CNP (Nurse Practitioner)           Click to link to [facility] Heart [facility] HEART [facility] ELECTROPHYSIOLOGY NOTE      Assessment/Recommendations   Assessment/Plan:    1.  Paroxysmal atrial fibrillation: She is having an increase in A. fib breakthrough with more frequent and prolonged episodes with which she is significantly symptomatic.  She also continues to have symptoms of fatigue and dyspnea on exertion, likely related to mild sinus bradycardia and side effects of sotalol.  She has essentially failed antiarrhythmic therapy with sotalol and would not likely tolerate a higher dose.  We reviewed the physiology and natural progression of atrial fibrillation as well as treatment options including continuing with sotalol despite side effects, doing a nuclear stress test and switching to flecainide, versus pulmonary vein isolation ablation.  We further discussed the ablation procedure utilizing cryo or radiofrequency, less than 1% risk for complication, and expected recovery.  We also discussed that ablation is more successful the earlier in A. fib progression that it is performed.  She was given information to review at home.  Continue sotalol 80 mg twice daily.    She was reassured that atrial fibrillation is not life-threatening, but carries an increased risk for stroke.  She has a [id number] score of 4 for age >75, female gender, and hypertension.  Continue Eliquis 5 mg twice daily for stroke prophylaxis.  She reports no missed doses, side effects, or bleeding issues.    2.  Hypertension: Blood pressure at target today.    Follow up with Dr. Coronel or Dr. Strong to further discuss A fib treatment options.     History of Present Illness    Ms. Estrellita Herring is a very pleasant  79 y.o. female who comes in today accompanied by one of her sons for EP follow-up of atrial fibrillation.  She was diagnosed with paroxysmal atrial fibrillation with rapid ventricular response in November 2018, but symptom onset was almost a year prior.  She is quite symptomatic with A. fib; symptoms consist of palpitations and fatigue.  She was started on sotalol 80 mg twice daily in December 2018 but with side effects of fatigue and possibly some mild dyspnea on exertion. 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  She remains on Eliquis 5 mg twice daily for stroke prophylaxis.    Estrellita had a prolonged episode of A. fib in June lasting for at least 36 hours as well as a couple of shorter episodes and another 4-hour episode in early July.  He continues to have ongoing baseline fatigue and mild dyspnea on exertion with more strenuous activities.  She denies chest discomfort, abdominal fullness/bloating or peripheral edema, shortness of breath at rest or with normal activity, paroxysmal nocturnal dyspnea, orthopnea, lightheadedness, dizziness, pre-syncope, or syncope.  She finds herself limiting her activity in an effort to prevent A. fib episodes.    Cardiographics (personally reviewed):  EKG, Done 12/17/2018 shows sinus bradycardia 52 bpm, QT/QTc interval measures 434/403 ms    Event monitor worn 11/7/18 through 11/27/18 shows sinus rhythm in the 60s-70s with normal electrocardiographic intervals with paroxysmal atrial fibrillation with ventricular rates 100-150 bpm.    Echo done 11/7/2018:    Left ventricle ejection fraction is normal. The calculated left ventricular ejection fraction is 56%.    Normal right ventricular size and systolic function.    No hemodynamically significant valvular heart abnormalities.    No previous study for comparison.  Left atrial volume is mildly increased     Physical Examination Review of Systems   Vitals:     07/29/19 0951   BP: 120/78   Pulse: 68   Resp: 16     Body mass index is 24.63 kg/m .  Wt Readings from Last 3 Encounters:   07/29/19 148 lb (67.1 kg)   04/23/19 145 lb (65.8 kg)   12/17/18 148 lb (67.1 kg)     General Appearance:   Alert, well-appearing and in no acute distress.   HEENT: Atraumatic, normocephalic.  No scleral icterus, normal conjunctivae, EOM intact; mucous membranes pink and moist.     Chest: Chest symmetric, spine straight.   Lungs:   Respirations unlabored: Lungs are clear to auscultation.   Cardiovascular:   Normal first and second heart sounds with no murmurs, rubs, or gallops.  Regular rate and rhythm.  Radial and posterior tibial pulses are intact, no edema.   Abdomen:  Soft, nontender, nondistended, bowel sounds present   Extremities: No cyanosis or clubbing   Musculoskeletal: Moves all extremities   Skin: Warm, dry, intact.    Neurologic: Mood and affect are appropriate, alert and oriented to person, place, time, and situation    General: WNL  Eyes: WNL  Ears/Nose/Throat: WNL  Lungs: WNL  Heart: WNL  Stomach: WNL  Bladder: WNL  Muscle/Joints: WNL  Skin: WNL  Nervous System: WNL  Mental Health: WNL     Blood: WNL     Medical History  Surgical History Family History Social History   Past Medical History:   Diagnosis Date   ? Ankylosis of spine    ? Diverticulitis    ? Hypertension    ? Iritis     Past Surgical History:   Procedure Laterality Date   ? CATARACT EXTRACTION, BILATERAL     ? COLECTOMY  2006    partial   ? TOTAL KNEE ARTHROPLASTY Left     Family History   Problem Relation Age of Onset   ? Alzheimer's disease Mother    ? Cancer Father         prostate   ? Atrial fibrillation Sister    ? Diabetes type II Sister     Social History     Socioeconomic History   ? Marital status:      Spouse name: Not on file   ? Number of children: Not on file   ? Years of education: Not on file   ? Highest education level: Not on file   Occupational History   ? Not on file   Social Needs   ?  Financial resource strain: Not on file   ? Food insecurity:     Worry: Not on file     Inability: Not on file   ? Transportation needs:     Medical: Not on file     Non-medical: Not on file   Tobacco Use   ? Smoking status: Never Smoker   ? Smokeless tobacco: Never Used   Substance and Sexual Activity   ? Alcohol use: No   ? Drug use: No   ? Sexual activity: Not on file   Lifestyle   ? Physical activity:     Days per week: Not on file     Minutes per session: Not on file   ? Stress: Not on file   Relationships   ? Social connections:     Talks on phone: Not on file     Gets together: Not on file     Attends Faith service: Not on file     Active member of club or organization: Not on file     Attends meetings of clubs or organizations: Not on file     Relationship status: Not on file   ? Intimate partner violence:     Fear of current or ex partner: Not on file     Emotionally abused: Not on file     Physically abused: Not on file     Forced sexual activity: Not on file   Other Topics Concern   ? Not on file   Social History Narrative   ? Not on file          Medications  Allergies   Current Outpatient Medications   Medication Sig Dispense Refill   ? apixaban (ELIQUIS) 5 mg Tab tablet Take 1 tablet (5 mg total) by mouth every 12 (twelve) hours. 60 tablet 11   ? calcium carbonate/vitamin D3 (CALCIUM+D ORAL) Take 1 tablet by mouth 2 (two) times a day.     ? inFLIXimab (REMICADE) 100 mg injection Infuse 400 mg into a venous catheter every 2 (two) months.     ? omeprazole (PRILOSEC) 20 MG capsule Take 20 mg by mouth daily.     ? prednisoLONE acetate (PRED-FORTE) 1 % ophthalmic suspension Administer 1 drop to both eyes every 4 (four) hours.     ? sotalol (BETAPACE) 80 MG tablet Take 1 tablet (80 mg total) by mouth 2 (two) times a day. 60 tablet 11   ? vit C/E/Zn/coppr/lutein/zeaxan (PRESERVISION AREDS-2 ORAL) Take 1 tablet by mouth 2 (two) times a day.       No current facility-administered medications for this visit.        Allergies   Allergen Reactions   ? Sulfa (Sulfonamide Antibiotics) Rash     hallucinations      Medical, surgical, family, social history, and medications were all reviewed and updated as necessary.   Lab Results    Chemistry CBC Other   Lab Results   Component Value Date    CREATININE 0.73 10/22/2018    BUN 14 10/22/2018     10/22/2018    K 4.1 10/22/2018     10/22/2018    CO2 27 10/22/2018     Creatinine (mg/dL)   Date Value   10/22/2018 0.73   05/01/2018 0.73   04/06/2017 0.78   11/25/2015 0.76    Lab Results   Component Value Date    WBC 5.9 05/27/2014    HGB 9.3 (L) 05/27/2014    HCT 29.0 (L) 05/27/2014    MCV 90 05/27/2014     05/27/2014    Lab Results   Component Value Date    INR 1.16 (H) 05/25/2014    INR 1.16 (H) 03/31/2013    INR 1.19 (H) 03/30/2013     Lab Results   Component Value Date    TSH 2.30 10/22/2018              This note has been dictated using voice recognition software. Any grammatical, typographical, or context distortions are unintentional and inherent to the software.    Ping Raya, [facility]   Electrophysiology

## 2021-06-27 NOTE — PROGRESS NOTES
Progress Notes by Ping Raya CNP at 12/14/2018  9:50 AM     Author: Ping Raya CNP Service: -- Author Type: Nurse Practitioner    Filed: 12/14/2018  3:10 PM Encounter Date: 12/14/2018 Status: Signed    : Ping Raya CNP (Nurse Practitioner)           Click to link to [facility] Heart [facility] HEART [facility] ELECTROPHYSIOLOGY NOTE      Assessment/Recommendations   Assessment/Plan:    1.  Paroxysmal atrial fibrillation: Episodes of A. fib with ventricular rates 100-150 bpm documented on event monitor associated with symptoms.  She also had symptomatic episodes of sinus rhythm with frequent PACs, though symptoms are different with these.  We had a lengthy discussion of the physiology and natural progression of atrial fibrillation as well as treatment options of rate control versus rhythm control depending upon the presence of symptoms.  We further discussed rhythm control with medications versus pulmonary vein isolation ablation.  On Saturday, discontinue metoprolol and start sotalol 80 mg twice daily.  EKG on Monday for QT interval monitoring.    She was reassured that atrial fibrillation is not life-threatening, but carries an increased risk for stroke.  She has a [id number] score of 4 for age >75, female gender, and hypertension.  Continue Eliquis 5 mg twice daily for stroke prophylaxis.  She reports no missed doses, side effects, or bleeding issues.    2.  Hypertension: Blood pressure at target today.    Follow up in 3 months, when she returns from [place].     History of Present Illness    Ms. Estrellita Herring is a very pleasant 78 y.o. female who comes in today for EP follow-up of atrial fibrillation.  She has been having episodes of an irregular heartbeat associated with fatigue and dyspnea on exertion off and on for the past year.  She reports it started in January or February 2018, after having an upper respiratory infection while in [place].  Event monitor documented episodes of  atrial fibrillation with rapid ventricular response associated with symptoms of palpitations.  Echocardiogram showed normal cardiac structure and function, TSH was normal.  She was started on metoprolol succinate 50 mg daily and Eliquis 5 mg twice daily for stroke prophylaxis. 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    Estrellita states that she continues to feel well outside of episodes of A. fib.  Episodes of A. fib continue to be frequent, but brief.  She denies chest discomfort, abdominal fullness/bloating or peripheral edema, shortness of breath, paroxysmal nocturnal dyspnea, orthopnea, lightheadedness, dizziness, pre-syncope, or syncope.  She continues to be very active in the frequency of A. fib is limiting her activity somewhat.  She plans to spend the winter in [place].    Cardiographics (personally reviewed):  EKG, Done 2/26/2013 shows sinus rhythm at 62 bpm, QT/QTc interval measures 382/385 ms    Echo done 11/7/2018:    Left ventricle ejection fraction is normal. The calculated left ventricular ejection fraction is 56%.    Normal right ventricular size and systolic function.    No hemodynamically significant valvular heart abnormalities.    No previous study for comparison.  Left atrial volume is mildly increased     Physical Examination Review of Systems   Vitals:    12/14/18 1010   BP: 138/70   Pulse: 64   Resp: 24     Body mass index is 25.08 kg/m .  Wt Readings from Last 3 Encounters:   12/14/18 150 lb 11.2 oz (68.4 kg)   11/09/18 151 lb 11.2 oz (68.8 kg)   11/07/18 152 lb (68.9 kg)     General Appearance:   Alert, well-appearing and in no acute distress.   HEENT: Atraumatic, normocephalic.  No scleral icterus, normal conjunctivae, EOM intact; mucous membranes pink and moist.     Chest: Chest symmetric, spine straight.   Lungs:   Respirations unlabored: Lungs are clear to auscultation.   Cardiovascular:   Normal first and second  heart sounds with no murmurs, rubs, or gallops.  Regular rate and rhythm.  Radial and posterior tibial pulses are intact, no edema.   Abdomen:  Soft, nontender, nondistended, bowel sounds present   Extremities: No cyanosis or clubbing   Musculoskeletal: Moves all extremities   Skin: Warm, dry, intact.    Neurologic: Mood and affect are appropriate, alert and oriented to person, place, time, and situation    General: WNL  Eyes: WNL  Ears/Nose/Throat: WNL  Lungs: WNL  Heart: WNL  Stomach: WNL  Bladder: WNL  Muscle/Joints: WNL  Skin: WNL  Nervous System: WNL  Mental Health: WNL     Blood: WNL     Medical History  Surgical History Family History Social History   Past Medical History:   Diagnosis Date   ? Ankylosis of spine    ? Diverticulitis    ? Hypertension    ? Iritis     Past Surgical History:   Procedure Laterality Date   ? CATARACT EXTRACTION, BILATERAL     ? COLECTOMY  2006    partial   ? TOTAL KNEE ARTHROPLASTY Left     Family History   Problem Relation Age of Onset   ? Alzheimer's disease Mother    ? Cancer Father         prostate   ? Atrial fibrillation Sister    ? Diabetes type II Sister     Social History     Socioeconomic History   ? Marital status:      Spouse name: Not on file   ? Number of children: Not on file   ? Years of education: Not on file   ? Highest education level: Not on file   Social Needs   ? Financial resource strain: Not on file   ? Food insecurity - worry: Not on file   ? Food insecurity - inability: Not on file   ? Transportation needs - medical: Not on file   ? Transportation needs - non-medical: Not on file   Occupational History   ? Not on file   Tobacco Use   ? Smoking status: Never Smoker   ? Smokeless tobacco: Never Used   Substance and Sexual Activity   ? Alcohol use: No   ? Drug use: No   ? Sexual activity: Not on file   Other Topics Concern   ? Not on file   Social History Narrative   ? Not on file          Medications  Allergies   Current Outpatient Medications    Medication Sig Dispense Refill   ? apixaban (ELIQUIS) 5 mg Tab tablet Take 1 tablet (5 mg total) by mouth every 12 (twelve) hours. 60 tablet 11   ? calcium carbonate/vitamin D3 (CALCIUM+D ORAL) Take 1 tablet by mouth 2 (two) times a day.     ? inFLIXimab (REMICADE) 100 mg injection Infuse 400 mg into a venous catheter every 2 (two) months.     ? omeprazole (PRILOSEC) 20 MG capsule Take 20 mg by mouth daily.     ? prednisoLONE acetate (PRED-FORTE) 1 % ophthalmic suspension Administer 1 drop to both eyes every 4 (four) hours.     ? vit C/E/Zn/coppr/lutein/zeaxan (PRESERVISION AREDS-2 ORAL) Take 1 tablet by mouth 2 (two) times a day.     ? sotalol (BETAPACE) 80 MG tablet Take 1 tablet (80 mg total) by mouth 2 (two) times a day. 60 tablet 11     No current facility-administered medications for this visit.       Allergies   Allergen Reactions   ? Sulfa (Sulfonamide Antibiotics) Rash     hallucinations      Medical, surgical, family, social history, and medications were all reviewed and updated as necessary.   Lab Results    Chemistry CBC Other   Lab Results   Component Value Date    CREATININE 0.73 10/22/2018    BUN 14 10/22/2018     10/22/2018    K 4.1 10/22/2018     10/22/2018    CO2 27 10/22/2018     Creatinine (mg/dL)   Date Value   10/22/2018 0.73   05/01/2018 0.73   04/06/2017 0.78   11/25/2015 0.76    Lab Results   Component Value Date    WBC 5.9 05/27/2014    HGB 9.3 (L) 05/27/2014    HCT 29.0 (L) 05/27/2014    MCV 90 05/27/2014     05/27/2014    Lab Results   Component Value Date    INR 1.16 (H) 05/25/2014    INR 1.16 (H) 03/31/2013    INR 1.19 (H) 03/30/2013     Lab Results   Component Value Date    TSH 2.30 10/22/2018            35 minutes were spent face to face in this visit with more than 50% spent discussing diagnoses as listed above, counseling, and coordination of care.    This note has been dictated using voice recognition software. Any grammatical, typographical, or context  distortions are unintentional and inherent to the software.    Ping Raya, [facility]   Electrophysiology

## 2021-06-27 NOTE — PROGRESS NOTES
Progress Notes by Gudelia Strong MD at 8/23/2019  8:30 AM     Author: Gudelia Strong MD Service: -- Author Type: Physician    Filed: 8/23/2019  9:20 AM Encounter Date: 8/23/2019 Status: Signed    : Gudelia Strong MD (Physician)           Click to link to [facility] HEART CARE NOTE    Thank you, Chai Smith MD, for asking the [facility] Heart Care team to see Ms. Estrellita Herring to evaluate atrial fibrillation and discuss candidacy for catheter ablation..      Assessment/Recommendations   Assessment:   1. Paroxysmal AF, highly symptomatic and sotalol-refractory.  Estrelilta is interested in a more definite therapy for her AF i.e. catheter ablation.  She remains a reasonable candidate for left atrial ablation.  I met with her and her son today and reviewed the nature, causes, mechanisms and progression of AF.  Reviewed the various therapy options including alternative antiarrhythmic drugs versus left atrial ablation.  I quoted joints a 75% chance of success at the expense of 2 to 3% risk of major complications including stroke, bleeding, cardiac perforation, esophageal injury, phrenic nerve injury, pulmonary vein stenosis, airway complications and a remote risk of death.  She is definitely leaning toward scheduling this procedure.    2. Exertional dyspnea and chest discomfort -worrisome for angina.  Interestingly, slight improvement after initiation of sotalol.  We will proceed with exercise Cardiolite stress test to rule out myocardial ischemia prior to scheduling catheter ablation.  3. Anemia, chronic normocytic, hemoglobin around 9.5 g/dL, likely of chronic disease secondary to her ankylosing spondylitis.  We will recheck a CBC today.  4. Estrellita and her son's questions were reviewed and answered to her satisfaction.  She understands the indication for stress testing.  She expressed understanding and agreed with the proposed plan of care.          History of Present  Illness/Subjective    Ms. Estrellita Herring is a 79 y.o. female with past medical history detailed below significant for PAF diagnosed about 2 years ago in [place] when she was seen at an outpatient clinic for flulike symptoms.  At that time, the patient was asymptomatic for AF, but subsequently started to be aware of her AF episodes noticing palpitations, shortness of breath and fatigue.  Her symptoms occur 2-3 times per week and are significantly affecting her quality of life.  Symptoms duration ranged between 30 minutes to 8 hours.    Since starting sotalol, her symptoms frequency and duration have lessened she noticed significant fatigue associated with medication use.   She is interested in exploring the option of catheter ablation aiming to reduce her AF symptoms burden and discontinue sotalol.  Simultaneously, Estrellita has been complaining of upper chest tightness and shortness of breath, not related to AF episodes or palpitations, associated with daily activities such as vacuuming and mopping.  Interestingly, the symptoms have slightly improved since initiation of sotalol. no bleeding, no stroke-like symptoms, no near syncope or syncope.    Problem list:   1. Paroxysmal atrial fibrillation diagnosed about 2 years ago.  2. Dyspnea with usual exertion, progressive.  3. Normocytic anemia, chronic.  4. Ankylosing spondylitis managed with infliximab.   5. Hypertension.   6. Long-term anticoagulation using apixaban 5 mg twice daily.     Cardiac data: ECGs, event monitor rhythm strips, echocardiogram and outside records from care everywhere reviewed.       Physical Examination Review of Systems   Vitals:    08/23/19 0831   BP: 152/62   Pulse: 72   Resp: 24     Body mass index is 24.84 kg/m .  Wt Readings from Last 3 Encounters:   08/23/19 149 lb 4.8 oz (67.7 kg)   07/29/19 148 lb (67.1 kg)   04/23/19 145 lb (65.8 kg)     [unfilled]    General     Appearance:   The patient is alert oriented to person place and situation.     The patient is in no acute distress at the time of my evaluation.   HEENT:  Pupils are equal, round, and reactive to light.  Conjunctiva and sclera are clear.  ENT: Oral mucosa is moist and without redness. No evident nasal discharge.   Neck: Without palpable thyroid or appreciable lymph nodes.   Chest: Symmetric, without deformity.   Lungs:   Clear bilaterally with no rales, rhonchi, or wheezes.     Cardiovascular:   Rhythm is regular. S1 and S2 are normal. No significant murmur is present. JVP is normal. Lower extremities demonstrate no significant edema. Distal pulses are intact bilaterally.   Abdomen:  Abdomen is flat, soft, and nontender.   Extremities: Without deformity.   Skin: Skin is warm, dry, and otherwise intact.   Neurologic: Gait is normal.           A 12 point comprehensive review of systems was  negative except as noted.      Medical History  Surgical History Family History Social History   Past Medical History:   Diagnosis Date   ? Ankylosis of spine    ? Diverticulitis    ? Hypertension    ? Iritis     Past Surgical History:   Procedure Laterality Date   ? CATARACT EXTRACTION, BILATERAL     ? COLECTOMY  2006    partial   ? TOTAL KNEE ARTHROPLASTY Left     Family History   Problem Relation Age of Onset   ? Alzheimer's disease Mother    ? Cancer Father         prostate   ? Atrial fibrillation Sister    ? Diabetes type II Sister     Social History     Socioeconomic History   ? Marital status:      Spouse name: Not on file   ? Number of children: Not on file   ? Years of education: Not on file   ? Highest education level: Not on file   Occupational History   ? Not on file   Social Needs   ? Financial resource strain: Not on file   ? Food insecurity:     Worry: Not on file     Inability: Not on file   ? Transportation needs:     Medical: Not on file     Non-medical: Not on file   Tobacco Use   ? Smoking status: Never Smoker   ? Smokeless tobacco: Never Used   Substance and Sexual Activity   ?  Alcohol use: No   ? Drug use: No   ? Sexual activity: Not on file   Lifestyle   ? Physical activity:     Days per week: Not on file     Minutes per session: Not on file   ? Stress: Not on file   Relationships   ? Social connections:     Talks on phone: Not on file     Gets together: Not on file     Attends Jew service: Not on file     Active member of club or organization: Not on file     Attends meetings of clubs or organizations: Not on file     Relationship status: Not on file   ? Intimate partner violence:     Fear of current or ex partner: Not on file     Emotionally abused: Not on file     Physically abused: Not on file     Forced sexual activity: Not on file   Other Topics Concern   ? Not on file   Social History Narrative   ? Not on file          Medications  Allergies   Reviewed. Allergies   Allergen Reactions   ? Sulfa (Sulfonamide Antibiotics) Rash     hallucinations         Lab Results    Chemistry/lipid CBC Cardiac Enzymes/BNP/TSH/INR   Lab Results   Component Value Date    CHOL 180 05/01/2018    HDL 42 (L) 05/01/2018    LDLCALC 114 05/01/2018    TRIG 121 05/01/2018    CREATININE 0.73 10/22/2018    BUN 14 10/22/2018    K 4.1 10/22/2018     10/22/2018     10/22/2018    CO2 27 10/22/2018    Lab Results   Component Value Date    WBC 5.9 05/27/2014    HGB 9.3 (L) 05/27/2014    HCT 29.0 (L) 05/27/2014    MCV 90 05/27/2014     05/27/2014    Lab Results   Component Value Date    TROPONINI 0.04 05/26/2014     05/25/2014    TSH 2.30 10/22/2018    INR 1.16 (H) 05/25/2014          Gudelia Strong MD, RS  Cardiac Electrophysiology

## 2021-06-28 NOTE — PROGRESS NOTES
Progress Notes by Ping Raya CNP at 11/14/2019  9:10 AM     Author: Ping Raya CNP Service: -- Author Type: Nurse Practitioner    Filed: 11/14/2019 10:42 AM Encounter Date: 11/14/2019 Status: Signed    : Ping Raya CNP (Nurse Practitioner)             Assessment/Recommendations   Assessment/Plan:    1.  Paroxysmal atrial fibrillation: No symptomatology or evidence of recurrence of atrial fibrillation since ablation.  She was restarted on sotalol 80 mg twice daily following PVI.  She continues to have some mild dyspnea on exertion which may be due to sotalol.  She has had an uneventful recovery from ablation with no emergency department or unscheduled clinic visits.  On 12/1/2019, discontinue sotalol and start metoprolol succinate 25 mg daily to be taken at night.  Schedule 2-week monitor to be worn once sotalol has been discontinued.    She has a [id number] score of 4 for age >75, female gender, and hypertension.  HAS-BLED score 1 for age > 65.    Because of the expense of Eliquis, we discussed alternatives of warfarin and left atrial appendage closure with the watchman device.  We discussed the importance in maintaining dietary consistency and frequency of INRs with warfarin.  She wishes to remain on Eliquis.  Continue Eliquis 5 mg twice daily for stroke prophylaxis.  She reports no missed doses, side effects, or bleeding issues.     2.  Hypertension: Blood pressure at target today.  Reevaluate with switching from sotalol to metoprolol.      Follow-up with Dr. Strong 3 months post PVI.     History of Present Illness/Subjective    Ms. sEtrellita Herring is a 79 y.o. female who comes in today accompanied by her son for EP follow-up of atrial fibrillation.  She was diagnosed with paroxysmal atrial fibrillation with rapid ventricular response in November 2018, but symptom onset was almost a year prior.  She is quite symptomatic with A. fib; symptoms consist of palpitations and fatigue.  She failed  antiarrhythmic therapy with sotalol.  She underwent pulmonary vein isolation ablation with Dr. Strong on 10/1/2019 with RF wide antral circumferential PVI.  Left atrial mapping showed normal voltage variability and no evidence of scar.  Sotalol 80 mg twice daily was restarted after PVI. 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  She remains on Eliquis 5 mg twice daily for stroke prophylaxis.     Estrellita states that she has been feeling well.   She has had occasional brief palpitations, but no episodes of A. fib.  She reports an uneventful recovery from ablation with no significant heartburn, difficulty swallowing, or groin site issues. She denies chest discomfort, sustained palpitations, abdominal fullness/bloating or peripheral edema, shortness of breath at rest or with normal activity, paroxysmal nocturnal dyspnea, orthopnea, lightheadedness, dizziness, pre-syncope, or syncope.    She has a baseline of mild dyspnea on exertion thought to be due to sotalol.    Cardiographics (EKG personally reviewed):  EKG, Done 9/24/2019 shows sinus bradycardia 50 bpm, QT/QTc interval measures 452/412 ms     Event monitor worn 11/7/18 through 11/27/18 shows sinus rhythm in the 60s-70s with normal electrocardiographic intervals with paroxysmal atrial fibrillation with ventricular rates 100-150 bpm.     Echo done 11/7/2018:    Left ventricle ejection fraction is normal. The calculated left ventricular ejection fraction is 56%.    Normal right ventricular size and systolic function.    No hemodynamically significant valvular heart abnormalities.    No previous study for comparison.  Left atrial volume is mildly increased     CT pulmonary veins done 9/17/2019:    Normal size and configuration of pulmonary veins.    The esophagus is adjacent to the posterior side of the left inferior pulmonary vein.  1.  No significant incidental extracardiac findings.  2.   Emphysematous change involving the lungs.  3.  Slight interval enlargement of a 12 mm peripherally enhancing lesion in segment 4A of the liver, which is favored to represent a cavernous hemangioma. (results previously forwarded to PMD)     NM pharmacologic stress test done 8/30/2019:    The pharmacologic nuclear stress test is negative for inducible myocardial ischemia or infarction.    The left ventricular ejection fraction is 81%.    Exercise was attempted but the patient was not able to exercise beyond 5 minutes and attain target heart rate.    There is no prior study available.       Physical Examination Review of Systems   Vitals:    11/14/19 0900   BP: 130/62   Pulse: 72   Resp: 20     Body mass index is 24.63 kg/m .  Wt Readings from Last 3 Encounters:   11/14/19 148 lb (67.1 kg)   10/04/19 149 lb 4.8 oz (67.7 kg)   10/02/19 150 lb 2 oz (68.1 kg)       General Appearance:   Alert, well-appearing and in no acute distress.   HEENT: Atraumatic, normocephalic.  No scleral icterus, normal conjunctivae, EOMs intact, PERRL.  Mucous membranes pink and moist.  No carotid bruit or obvious thyromegaly.   Chest/Lungs:   Chest symmetric, spine straight.  Respirations unlabored.  Lungs are clear to auscultation.   Cardiovascular:   Regular rate and rhythm.  Normal first and second heart sounds with no murmurs, rubs, or gallops; radial and posterior tibial pulses are intact, No edema.   Abdomen:  Soft, nondistended, bowel sounds present.   Extremities: No cyanosis or clubbing.   Musculoskeletal: Moves all extremities.  Gait steady.   Skin: Warm, dry, intact.    Neurologic: Mood and affect are appropriate.  Alert and oriented to person, place, time, and situation.    General: WNL  Eyes: WNL  Ears/Nose/Throat: WNL  Lungs: WNL  Heart: WNL  Stomach: WNL  Bladder: Frequent Urination at Night  Muscle/Joints: WNL  Skin: WNL  Nervous System: WNL  Mental Health: WNL     Blood: WNL       Medical History  Surgical History Family  History Social History   Past Medical History:   Diagnosis Date   ? Ankylosis of spine    ? Atrial fibrillation (H)    ? Diverticulitis    ? Hypertension    ? Iritis     Past Surgical History:   Procedure Laterality Date   ? CATARACT EXTRACTION, BILATERAL     ? COLECTOMY  2006    partial   ? EP ABLATION  10/1/2019        ? EP ABLATION PVI Left 10/1/2019    Procedure: EP Ablation PVI;  Surgeon: Gudelia Strong MD;  Location: [facility];  Service: Cardiology   ? TOTAL KNEE ARTHROPLASTY Left     Family History   Problem Relation Age of Onset   ? Alzheimer's disease Mother    ? Cancer Father         prostate   ? Atrial fibrillation Sister    ? Diabetes type II Sister     Social History     Tobacco Use   ? Smoking status: Never Smoker   ? Smokeless tobacco: Never Used   Substance Use Topics   ? Alcohol use: No   ? Drug use: No          Medications  Allergies   Current Outpatient Medications   Medication Sig Dispense Refill   ? apixaban (ELIQUIS) 5 mg Tab tablet Take 1 tablet (5 mg total) by mouth every 12 (twelve) hours. 180 tablet 3   ? calcium carbonate/vitamin D3 (CALCIUM+D ORAL) Take 1 tablet by mouth 2 (two) times a day.     ? inFLIXimab (REMICADE) 100 mg injection Infuse 400 mg into a venous catheter every 2 (two) months.     ? omeprazole (PRILOSEC) 20 MG capsule Take 20 mg by mouth daily.           ? sotalol (BETAPACE) 80 MG tablet Take 1 tablet (80 mg total) by mouth every 12 (twelve) hours. Continue for 1 month, then stop 60 tablet 0   ? vit C/E/Zn/coppr/lutein/zeaxan (PRESERVISION AREDS-2 ORAL) Take 1 tablet by mouth 2 (two) times a day.     ? [START ON 12/2/2019] metoprolol succinate (TOPROL-XL) 25 MG Take 1 tablet (25 mg total) by mouth daily. 90 tablet 3     No current facility-administered medications for this visit.     Allergies   Allergen Reactions   ? Sulfa (Sulfonamide Antibiotics) Rash     hallucinations         Lab Results    Chemistry/lipid CBC Other   Lab Results   Component Value Date     CREATININE 0.74 10/21/2019    BUN 13 10/21/2019     10/21/2019    K 4.6 10/21/2019     10/21/2019    CO2 28 10/21/2019     Creatinine (mg/dL)   Date Value   10/21/2019 0.74   10/04/2019 0.73   10/02/2019 0.87   10/01/2019 0.75    Lab Results   Component Value Date    WBC 7.5 10/21/2019    HGB 10.7 (L) 10/21/2019    HCT 36.5 10/21/2019    MCV 92 10/21/2019     10/21/2019    Lab Results   Component Value Date    TROPONINI 0.04 05/26/2014     05/25/2014    TSH 2.30 10/22/2018        This note has been dictated using voice recognition software. Any grammatical, typographical, or context distortions are unintentional and inherent to the software.

## 2021-06-28 NOTE — PROGRESS NOTES
Progress Notes by Ping Raya CNP at 9/24/2019  9:50 AM     Author: Ping Raya CNP Service: -- Author Type: Nurse Practitioner    Filed: 9/24/2019 10:47 AM Encounter Date: 9/24/2019 Status: Signed    : Ping Raya CNP (Nurse Practitioner)           Click to link to [facility] HEART [facility] ELECTROPHYSIOLOGY NOTE      Assessment/Recommendations   Assessment/Plan:    1.  Paroxysmal atrial fibrillation: Initially diagnosed in November 2018, but symptom onset a year prior.  Symptoms consist of palpitations and fatigue.  She has failed antiarrhythmic therapy with sotalol due to side effects and breakthrough.  She is scheduled for pulmonary vein isolation ablation with Dr. Strong on 10/1/2019.  We discussed the ablation procedure, risks for complication, and expected recovery.  Omeprazole was increased to 20 mg twice daily on 9/1/2019 in preparation for ablation and will continue with this increased dose for 4 weeks following ablation.  On 9/29/2019, discontinue sotalol and start metoprolol succinate 25 mg daily to be taken at night.  She has a [id number] score of 4 for age >75, female gender, and hypertension.  HAS-BLED score 1 for age > 65.   Continue Eliquis 5 mg twice daily for stroke prophylaxis.  She reports no missed doses, side effects, or bleeding issues.    2.  Hypertension: Blood pressure at target today.  Reevaluate with decrease in beta-blocker as above.     Post ablation check on 10/4/2019.  Follow-up with me 6 weeks post PVI.  Follow-up with Dr. Strong 3 months post PVI.     History of Present Illness    Ms. Estrellita Herring is a very pleasant 79 y.o. female who comes in today for EP follow-up of atrial fibrillation and history and physical prior to pulmonary vein isolation ablation.  She was diagnosed with paroxysmal atrial fibrillation with rapid ventricular response in November 2018, but symptom onset was almost a year prior.  She is quite symptomatic with A. fib;  symptoms consist of palpitations and fatigue.  She was started on sotalol 80 mg twice daily in December 2018 but with side effects of fatigue and possibly some mild dyspnea on exertion.  She also has breakthrough episodes of A. fib lasting for 1 to 2 days every couple of weeks.  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  She remains on Eliquis 5 mg twice daily for stroke prophylaxis.    Estrellita states that she has been feeling well, though continues to have symptomatic episodes of A. fib.  She also has ongoing fatigue and mild dyspnea on exertion with more strenuous activities.  Nuclear stress test showed no evidence of ischemia.  She denies chest discomfort, abdominal fullness/bloating or peripheral edema, shortness of breath at rest or with normal activity, paroxysmal nocturnal dyspnea, orthopnea, lightheadedness, dizziness, pre-syncope, or syncope.      Cardiographics (personally reviewed):  EKG, Done 9/24/2019 shows sinus bradycardia 50 bpm, QT/QTc interval measures 452/412 ms    Event monitor worn 11/7/18 through 11/27/18 shows sinus rhythm in the 60s-70s with normal electrocardiographic intervals with paroxysmal atrial fibrillation with ventricular rates 100-150 bpm.    Echo done 11/7/2018:    Left ventricle ejection fraction is normal. The calculated left ventricular ejection fraction is 56%.    Normal right ventricular size and systolic function.    No hemodynamically significant valvular heart abnormalities.    No previous study for comparison.  Left atrial volume is mildly increased     CT pulmonary veins done 9/17/2019:    Normal size and configuration of pulmonary veins.    The esophagus is adjacent to the posterior side of the left inferior pulmonary vein.  Left superior pulmonary vein: 21 x 15mm, area 2.5sq cm.  Left inferior pulmonary vein: 10 x 8mm, area 0.7sq cm.  Right superior pulmonary vein: 25 x 18mm, area 3.7sq cm.  Right  inferior pulmonary vein: 15 x 18mm, area 2.1sq cm.  1.  No significant incidental extracardiac findings.  2.  Emphysematous change involving the lungs.  3.  Slight interval enlargement of a 12 mm peripherally enhancing lesion in segment 4A of the liver, which is favored to represent a cavernous hemangioma. (results forwarded to PMD)    NM pharmacologic stress test done 8/30/2019:    The pharmacologic nuclear stress test is negative for inducible myocardial ischemia or infarction.    The left ventricular ejection fraction is 81%.    Exercise was attempted but the patient was not able to exercise beyond 5 minutes and attain target heart rate.    There is no prior study available.       Physical Examination Review of Systems   Vitals:    09/24/19 0922   BP: 112/70   Pulse: 60   Resp: 14     Body mass index is 24.63 kg/m .  Wt Readings from Last 3 Encounters:   09/24/19 148 lb (67.1 kg)   08/23/19 149 lb 4.8 oz (67.7 kg)   07/29/19 148 lb (67.1 kg)     General Appearance:   Alert, well-appearing and in no acute distress.   HEENT: Atraumatic, normocephalic.  No scleral icterus, small sub-conjunctival hemorrhage right eye, otherwise normal conjunctiva, PERRL, EOM intact; mucous membranes pink and moist.     Chest: Chest symmetric, spine straight.   Lungs:   Respirations unlabored: Lungs are clear to auscultation.   Cardiovascular:   Normal first and second heart sounds with no murmurs, rubs, or gallops.  Regular rate and rhythm.  Radial and posterior tibial pulses are intact, no edema.   Abdomen:  Soft, nontender, nondistended, bowel sounds present   Extremities: No cyanosis or clubbing   Musculoskeletal: Moves all extremities   Skin: Warm, dry, intact.    Neurologic: Mood and affect are appropriate, alert and oriented to person, place, time, and situation    General: WNL  Eyes: WNL  Ears/Nose/Throat: WNL  Lungs: WNL  Heart: WNL  Stomach: WNL  Bladder: WNL  Muscle/Joints: WNL  Skin: WNL  Nervous System: WNL  Mental  Health: WNL     Blood: WNL     Medical History  Surgical History Family History Social History   Past Medical History:   Diagnosis Date   ? Ankylosis of spine    ? Diverticulitis    ? Hypertension    ? Iritis     Past Surgical History:   Procedure Laterality Date   ? CATARACT EXTRACTION, BILATERAL     ? COLECTOMY  2006    partial   ? TOTAL KNEE ARTHROPLASTY Left     Family History   Problem Relation Age of Onset   ? Alzheimer's disease Mother    ? Cancer Father         prostate   ? Atrial fibrillation Sister    ? Diabetes type II Sister     Social History     Tobacco Use   ? Smoking status: Never Smoker   ? Smokeless tobacco: Never Used   Substance Use Topics   ? Alcohol use: No   ? Drug use: No            Medications  Allergies   Current Outpatient Medications   Medication Sig Dispense Refill   ? apixaban (ELIQUIS) 5 mg Tab tablet Take 1 tablet (5 mg total) by mouth every 12 (twelve) hours. 60 tablet 11   ? calcium carbonate/vitamin D3 (CALCIUM+D ORAL) Take 1 tablet by mouth 2 (two) times a day.     ? ferrous sulfate 65 mg elemental iron Take 1 tablet by mouth daily with breakfast.     ? inFLIXimab (REMICADE) 100 mg injection Infuse 400 mg into a venous catheter every 2 (two) months.     ? omeprazole (PRILOSEC) 20 MG capsule Take 20 mg by mouth 2 (two) times a day.           ? vit C/E/Zn/coppr/lutein/zeaxan (PRESERVISION AREDS-2 ORAL) Take 1 tablet by mouth 2 (two) times a day.     ? metoprolol succinate (TOPROL-XL) 25 MG Take 1 tablet (25 mg total) by mouth daily. 90 tablet 3     No current facility-administered medications for this visit.       Allergies   Allergen Reactions   ? Sulfa (Sulfonamide Antibiotics) Rash     hallucinations       Prior incidence of post anesthesia nausea   Medical, surgical, family, social history, and medications were all reviewed and updated as necessary.   Lab Results    Chemistry CBC Other   Lab Results   Component Value Date    CREATININE 0.74 09/24/2019    BUN 16 09/24/2019    NA  137 09/24/2019    K 5.0 09/24/2019     09/24/2019    CO2 29 09/24/2019     Creatinine (mg/dL)   Date Value   09/24/2019 0.74   08/23/2019 0.78   10/22/2018 0.73   05/01/2018 0.73    Lab Results   Component Value Date    WBC 7.1 09/24/2019    HGB 10.9 (L) 09/24/2019    HCT 36.8 09/24/2019    MCV 82 09/24/2019     09/24/2019      Lab Results   Component Value Date    TSH 2.30 10/22/2018          45 minutes were spent face to face in this visit with more than 50% spent discussing diagnoses as listed above, counseling, and coordination of care.    This note has been dictated using voice recognition software. Any grammatical, typographical, or context distortions are unintentional and inherent to the software.    Ping Raya, [facility]   Electrophysiology

## 2021-06-29 NOTE — PROGRESS NOTES
Progress Notes by Ping Raya CNP at 10/27/2020  9:50 AM     Author: Ping Raya CNP Service: -- Author Type: Nurse Practitioner    Filed: 10/27/2020 11:25 AM Encounter Date: 10/27/2020 Status: Signed    : Ping Raya CNP (Nurse Practitioner)             Assessment/Recommendations   Assessment/Plan:    1.  Paroxysmal atrial fibrillation: No symptomatology or evidence of recurrence of atrial fibrillation.  She remains off membrane active antiarrhythmic medications.  Continue metoprolol succinate 25 mg daily.    She has a [id number] score of 4 for age >75, female gender, and hypertension.  HAS-BLED score 2 for age > 65 and bleeding disposition with chronic blood loss anemia.   Eliquis is also becoming very expensive.  We discussed the alternative of left atrial appendage closure with the watchman device.  We discussed the procedure, risk for complication, and medication transition from Eliquis to aspirin/Plavix to low-dose daily aspirin.  She was given some information to review at home and will consider this option.   Continue Eliquis 5 mg twice daily for stroke prophylaxis.      2.  Hypertension: Blood pressure at elevated today, but is consistently within target.       Follow-up in 1 year.     History of Present Illness/Subjective    Ms. Estrellita Herring is a 80 y.o. female who comes in today accompanied by her son for EP follow-up of atrial fibrillation.  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   She was diagnosed with paroxysmal atrial fibrillation with rapid ventricular response in November 2018, but symptom onset was almost a year prior.  She is quite symptomatic with A. fib; symptoms consist of palpitations and fatigue.  She failed antiarrhythmic therapy with sotalol.  She underwent pulmonary vein isolation ablation with Dr. Strong on 10/1/2019 with RF wide antral circumferential PVI.  Left atrial  mapping showed normal voltage variability and no evidence of scar.  Sotalol 80 mg twice daily was restarted after PVI.  She had no recurrence of atrial arrhythmias, so sotalol was discontinued on 12/1/2020.   She remains on Eliquis 5 mg twice daily for stroke prophylaxis.  However, she is also having issues with chronic anemia and recurrent blood loss, though no obvious bleed.      Estrellita states that she has been feeling well.  She has not had any episodes of A. fib. She denies chest discomfort, palpitations, abdominal fullness/bloating or peripheral edema, shortness of breath at rest or with normal activity, paroxysmal nocturnal dyspnea, orthopnea, lightheadedness, dizziness, pre-syncope, or syncope.    She will be spending the winter in [place], though returning periodically for her Remicade treatment.    Cardiographics (EKG personally reviewed):  EKG, Done 10/2/2019 shows sinus rhythm at 64 bpm, QT/QTc interval measures 414/427 ms     Event monitor worn 12/2/2019 through 12/15/2019 shows sinus rhythm with an average heart rate of 79 bpm and a range of 52 to 152 bpm.  There was a 7-second run of ectopic atrial tachycardia, but no atrial fibrillation.  Frequent PACs with a burden of 14%.  No significant bradycardia or pauses.  No significant ventricular ectopy.     Echo done 11/7/2018:    Left ventricle ejection fraction is normal. The calculated left ventricular ejection fraction is 56%.    Normal right ventricular size and systolic function.    No hemodynamically significant valvular heart abnormalities.    No previous study for comparison.  Left atrial volume is mildly increased     CT pulmonary veins done 9/17/2019:    Normal size and configuration of pulmonary veins.    The esophagus is adjacent to the posterior side of the left inferior pulmonary vein.  1.  No significant incidental extracardiac findings.  2.  Emphysematous change involving the lungs.  3.  Slight interval enlargement of a 12 mm peripherally  enhancing lesion in segment 4A of the liver, which is favored to represent a cavernous hemangioma. (results previously forwarded to PMD)     NM pharmacologic stress test done 8/30/2019:    The pharmacologic nuclear stress test is negative for inducible myocardial ischemia or infarction.    The left ventricular ejection fraction is 81%.    Exercise was attempted but the patient was not able to exercise beyond 5 minutes and attain target heart rate.    There is no prior study available.       Physical Examination Review of Systems   Vitals:    10/27/20 1000   BP: 150/76   Pulse: 84   SpO2: 98%     Body mass index is 23.96 kg/m .  Wt Readings from Last 3 Encounters:   10/27/20 144 lb (65.3 kg)   11/14/19 148 lb (67.1 kg)   10/04/19 149 lb 4.8 oz (67.7 kg)       General Appearance:   Alert, well-appearing and in no acute distress.   HEENT: Atraumatic, normocephalic.  No scleral icterus, normal conjunctivae, EOMs intact, PERRL.    Chest/Lungs:   Chest symmetric, spine straight.  Respirations unlabored.  Lungs are clear to auscultation.   Cardiovascular:   Regular rate and rhythm.  Normal first and second heart sounds with no murmurs, rubs, or gallops; radial and posterior tibial pulses are intact, No edema.   Abdomen:  Soft, nondistended, bowel sounds present.   Extremities: No cyanosis or clubbing.   Musculoskeletal: Moves all extremities.     Skin: Warm, dry, intact.    Neurologic: Mood and affect are appropriate.  Alert and oriented to person, place, time, and situation.    General: WNL  Eyes: WNL  Ears/Nose/Throat: WNL  Lungs: WNL  Heart: WNL  Stomach: WNL  Bladder: WNL  Muscle/Joints: WNL  Skin: WNL  Nervous System: WNL  Mental Health: WNL     Blood: WNL       Medical History  Surgical History Family History Social History   Past Medical History:   Diagnosis Date   ? Ankylosis of spine    ? Atrial fibrillation (H)    ? Diverticulitis    ? Hypertension    ? Iritis     Past Surgical History:   Procedure Laterality  Date   ? CATARACT EXTRACTION, BILATERAL     ? COLECTOMY  2006    partial   ? EP ABLATION  10/1/2019        ? EP ABLATION PVI Left 10/1/2019    Procedure: EP Ablation PVI;  Surgeon: Gudelia Strong MD;  Location: [facility];  Service: Cardiology   ? TOTAL KNEE ARTHROPLASTY Left     Family History   Problem Relation Age of Onset   ? Alzheimer's disease Mother    ? Cancer Father         prostate   ? Atrial fibrillation Sister    ? Diabetes type II Sister     Social History     Tobacco Use   ? Smoking status: Never Smoker   ? Smokeless tobacco: Never Used   Substance Use Topics   ? Alcohol use: No   ? Drug use: No          Medications  Allergies   Current Outpatient Medications   Medication Sig Dispense Refill   ? apixaban ANTICOAGULANT (ELIQUIS) 5 mg Tab tablet Take 1 tablet (5 mg total) by mouth every 12 (twelve) hours. 180 tablet 3   ? calcium carbonate/vitamin D3 (CALCIUM+D ORAL) Take 1 tablet by mouth 2 (two) times a day.     ? inFLIXimab (REMICADE) 100 mg injection Infuse 400 mg into a venous catheter every 2 (two) months.     ? iron 18 mg Tab      ? metoprolol succinate (TOPROL-XL) 25 MG Take 1 tablet (25 mg total) by mouth daily. 90 tablet 3   ? omeprazole (PRILOSEC) 20 MG capsule TAKE 1 CAPSULE BY MOUTH ONCE DAILY OCCASIONALLY TWICE DAILY     ? vit C/E/Zn/coppr/lutein/zeaxan (PRESERVISION AREDS-2 ORAL) Take 1 tablet by mouth 2 (two) times a day.       No current facility-administered medications for this visit.     Allergies   Allergen Reactions   ? Sulfa (Sulfonamide Antibiotics) Rash     hallucinations         Lab Results    Chemistry/lipid CBC Other   Lab Results   Component Value Date    CREATININE 0.74 10/21/2019    BUN 13 10/21/2019     10/21/2019    K 4.6 10/21/2019     10/21/2019    CO2 28 10/21/2019     Creatinine (mg/dL)   Date Value   10/21/2019 0.74   10/04/2019 0.73   10/02/2019 0.87   10/01/2019 0.75    Lab Results   Component Value Date    WBC 7.5 10/21/2019    HGB 10.7 (L)  10/21/2019    HCT 36.5 10/21/2019    MCV 92 10/21/2019     10/21/2019    Lab Results   Component Value Date    TROPONINI 0.04 05/26/2014     05/25/2014    TSH 2.30 10/22/2018        This note has been dictated using voice recognition software. Any grammatical, typographical, or context distortions are unintentional and inherent to the software.

## 2021-06-29 NOTE — PROGRESS NOTES
"Progress Notes by Gudelia Strong MD at 6/29/2020 11:30 AM     Author: Gudelia Strong MD Service: -- Author Type: Physician    Filed: 6/29/2020 11:51 AM Encounter Date: 6/29/2020 Status: Signed    : Gudelia Strong MD (Physician)           The patient has been notified of following:     \"This telephone visit will be conducted via a call between you and your physician/provider. We have found that certain health care needs can be provided without the need for a physical exam.  This service lets us provide the care you need with a phone conversation.  If a prescription is necessary we can send it directly to your pharmacy.  If lab work is needed we can place an order for that and you can then stop by our lab to have the test done at a later time. If during the course of the call the physician/provider feels a telephone visit is not appropriate, you will not be charged for this service.\" Verbal consent has been obtained for this service by care team member:         HEART CARE PHONE ENCOUNTER        The patient has chosen to have the visit conducted as a telephone visit, to reduce risk of exposure given the current status of Coronavirus in our community. This telephone visit is being conducted via a call between the patient and physician/provider. Health care needs are being provided without a physical exam.     Assessment/Recommendations   Assessment:      1. Persistent atrial fibrillation, asymptomatic following left atrial ablation in October 2019.  2. At increased risk for stroke and thromboembolism with chadsvasc 4.     3. Long-term use of infliximab for ankylosing spondylitis and inflammatory arthritis.  4. Chronic anemia, chronic iron deficiency and blood losses.    5. Insomnia, cause to be determined, defer management to primary MD.    Plan:  Estrellita remains asymptomatic since catheter ablation, remains off sotalol and uses metoprolol tartrate 50 mg twice daily.   She is at increased risk for stroke and " thromboembolic events and long-term anticoagulations indicated.  However she continues to suffer with recurrent blood losses and anemia.   She is a reasonable candidate for left atrial appendage occlusion.  Discussed this option with Estrellita who does not wish to proceed at this time due insurance/financial difficulties.   Encouraged her to consider this option and discuss it during upcoming EP NP clinic visit.   Recommend to recheck iron levels and CBC with Dr. Quintero.    Regarding her insomnia, consider trying melatonin, defer management and treatment to Dr. Quintero.    Follow Up Plan: Follow up in 3 months in [facility] Clinic to discuss management of anticoagulation candidacy for Watchman.   I have reviewed the note as documented.  This accurately captures the substance of my conversation with the patient.    Total time of call between patient and provider was 20 minutes   Start Time:11:30 am   Stop Time: 11:50       History of Present Illness/Subjective    Estrellita Herring is a 79 y.o. female who is being evaluated via a billable telephone visit.      I have reviewed and updated the patient's Past Medical History, Social History, Family History and Medication List.    Estrellita is an 79-year-old female patient with history of longstanding persistent, highly symptomatic and drug refractory atrial fibrillation who underwent left atrial ablation with wide antral circumferential ablation and complete pulmonary veins isolation on October 1, 2019.    She continues to do well after her procedure, denies any recurrence of palpitations, denies any pain, shortness of breath, or stroke-like symptoms around or following her procedure.  She continues to suffer with chronic anemia and chronic blood losses.  During her stay in [place] in April 2020, she suffered with a colon abscess treated with prolonged course of antibiotic.   She uses infliximab for ankylosing spondylitis and inflammatory arthritis.    An event monitor completed  in December 2019 showed a high burden of atrial ectopy with 14% burden of PACs.  Also demonstrated a 7-second run of atrial tachycardia on December 4 without atrial fibrillation.    Other symptoms reported by the patient today include insomnia.  The patient believes that her difficulty sleeping is likely caused by the use of apixaban.   She also agrees that her anemia and iron deficiency could be also contributing.     Physical Examination not performed given phone encounter Review of Systems          Negative except for findings listed in HPI.                                      Medical History  Surgical History Family History Social History   Past Medical History:   Diagnosis Date   ? Ankylosis of spine    ? Atrial fibrillation (H)    ? Diverticulitis    ? Hypertension    ? Iritis     Past Surgical History:   Procedure Laterality Date   ? CATARACT EXTRACTION, BILATERAL     ? COLECTOMY  2006    partial   ? EP ABLATION  10/1/2019        ? EP ABLATION PVI Left 10/1/2019    Procedure: EP Ablation PVI;  Surgeon: Gudelia Strong MD;  Location: [facility];  Service: Cardiology   ? TOTAL KNEE ARTHROPLASTY Left     Family History   Problem Relation Age of Onset   ? Alzheimer's disease Mother    ? Cancer Father         prostate   ? Atrial fibrillation Sister    ? Diabetes type II Sister     Social History     Socioeconomic History   ? Marital status:      Spouse name: Not on file   ? Number of children: Not on file   ? Years of education: Not on file   ? Highest education level: Not on file   Occupational History   ? Not on file   Social Needs   ? Financial resource strain: Not on file   ? Food insecurity     Worry: Not on file     Inability: Not on file   ? Transportation needs     Medical: Not on file     Non-medical: Not on file   Tobacco Use   ? Smoking status: Never Smoker   ? Smokeless tobacco: Never Used   Substance and Sexual Activity   ? Alcohol use: No   ? Drug use: No   ? Sexual activity: Not on  file   Lifestyle   ? Physical activity     Days per week: Not on file     Minutes per session: Not on file   ? Stress: Not on file   Relationships   ? Social connections     Talks on phone: Not on file     Gets together: Not on file     Attends Congregational service: Not on file     Active member of club or organization: Not on file     Attends meetings of clubs or organizations: Not on file     Relationship status: Not on file   ? Intimate partner violence     Fear of current or ex partner: Not on file     Emotionally abused: Not on file     Physically abused: Not on file     Forced sexual activity: Not on file   Other Topics Concern   ? Not on file   Social History Narrative   ? Not on file          Medications  Allergies   Current Outpatient Medications   Medication Sig Dispense Refill   ? apixaban ANTICOAGULANT (ELIQUIS) 5 mg Tab tablet Take 1 tablet (5 mg total) by mouth every 12 (twelve) hours. 180 tablet 0   ? inFLIXimab (REMICADE) 100 mg injection Infuse 400 mg into a venous catheter every 2 (two) months.     ? metoprolol succinate (TOPROL-XL) 25 MG Take 1 tablet (25 mg total) by mouth daily. 90 tablet 3   ? vit C/E/Zn/coppr/lutein/zeaxan (PRESERVISION AREDS-2 ORAL) Take 1 tablet by mouth 2 (two) times a day.     ? calcium carbonate/vitamin D3 (CALCIUM+D ORAL) Take 1 tablet by mouth 2 (two) times a day.       No current facility-administered medications for this visit.     Allergies   Allergen Reactions   ? Sulfa (Sulfonamide Antibiotics) Rash     hallucinations         Lab Results    Chemistry/lipid CBC Cardiac Enzymes/BNP/TSH/INR   Lab Results   Component Value Date    CHOL 180 05/01/2018    HDL 42 (L) 05/01/2018    LDLCALC 114 05/01/2018    TRIG 121 05/01/2018    CREATININE 0.74 10/21/2019    BUN 13 10/21/2019    K 4.6 10/21/2019     10/21/2019     10/21/2019    CO2 28 10/21/2019    Lab Results   Component Value Date    WBC 7.5 10/21/2019    HGB 10.7 (L) 10/21/2019    HCT 36.5 10/21/2019    MCV 92  10/21/2019     10/21/2019    Lab Results   Component Value Date    TROPONINI 0.04 05/26/2014     05/25/2014    TSH 2.30 10/22/2018    INR 1.16 (H) 05/25/2014        Gudelia Strong

## 2021-07-22 ENCOUNTER — TELEPHONE (OUTPATIENT)
Dept: CARDIOLOGY | Facility: CLINIC | Age: 81
End: 2021-07-22

## 2021-07-22 NOTE — TELEPHONE ENCOUNTER
Received call from patient, she states she spoke with her children regarding LAAC and would like to proceed with consult. Referral from [facility] 11/18/20. Briefly discussed procedure with patient and expectations, but informed patient consult with JULIET Bear would provide her with greater amount of detail. She would like more information, patient would like to schedule consult. Informed patient scheduling would reach out to her to arrange for consult date. Encouraged her to return call should questions/concerns arise. Verbalized understanding, no further questions at this time. [facility]

## 2021-08-05 ENCOUNTER — OFFICE VISIT (OUTPATIENT)
Dept: CARDIOLOGY | Facility: CLINIC | Age: 81
End: 2021-08-05
Payer: COMMERCIAL

## 2021-08-05 VITALS
SYSTOLIC BLOOD PRESSURE: 140 MMHG | HEART RATE: 68 BPM | HEIGHT: 65 IN | DIASTOLIC BLOOD PRESSURE: 68 MMHG | WEIGHT: 146 LBS | RESPIRATION RATE: 16 BRPM | BODY MASS INDEX: 24.32 KG/M2

## 2021-08-05 DIAGNOSIS — I48.0 PAROXYSMAL ATRIAL FIBRILLATION (H): ICD-10-CM

## 2021-08-05 DIAGNOSIS — Z98.890 STATUS POST CATHETER ABLATION OF ATRIAL FIBRILLATION: ICD-10-CM

## 2021-08-05 DIAGNOSIS — I10 ESSENTIAL HYPERTENSION: ICD-10-CM

## 2021-08-05 DIAGNOSIS — D50.9 IRON DEFICIENCY ANEMIA, UNSPECIFIED IRON DEFICIENCY ANEMIA TYPE: Primary | ICD-10-CM

## 2021-08-05 DIAGNOSIS — K57.32 DIVERTICULITIS OF COLON: ICD-10-CM

## 2021-08-05 PROBLEM — D64.9 ANEMIA: Status: ACTIVE | Noted: 2021-08-05

## 2021-08-05 LAB
ANION GAP SERPL CALCULATED.3IONS-SCNC: 7 MMOL/L (ref 5–18)
BUN SERPL-MCNC: 13 MG/DL (ref 8–28)
CALCIUM SERPL-MCNC: 9.8 MG/DL (ref 8.5–10.5)
CHLORIDE BLD-SCNC: 104 MMOL/L (ref 98–107)
CO2 SERPL-SCNC: 27 MMOL/L (ref 22–31)
CREAT SERPL-MCNC: 0.76 MG/DL (ref 0.6–1.1)
ERYTHROCYTE [DISTWIDTH] IN BLOOD BY AUTOMATED COUNT: 16 % (ref 10–15)
GFR SERPL CREATININE-BSD FRML MDRD: 73 ML/MIN/1.73M2
GLUCOSE BLD-MCNC: 86 MG/DL (ref 70–125)
HCT VFR BLD AUTO: 34.7 % (ref 35–47)
HGB BLD-MCNC: 10.4 G/DL (ref 11.7–15.7)
MCH RBC QN AUTO: 25.4 PG (ref 26.5–33)
MCHC RBC AUTO-ENTMCNC: 30 G/DL (ref 31.5–36.5)
MCV RBC AUTO: 85 FL (ref 78–100)
PLATELET # BLD AUTO: 314 10E3/UL (ref 150–450)
POTASSIUM BLD-SCNC: 4.5 MMOL/L (ref 3.5–5)
RBC # BLD AUTO: 4.1 10E6/UL (ref 3.8–5.2)
SODIUM SERPL-SCNC: 138 MMOL/L (ref 136–145)
WBC # BLD AUTO: 7 10E3/UL (ref 4–11)

## 2021-08-05 PROCEDURE — 80048 BASIC METABOLIC PNL TOTAL CA: CPT | Performed by: INTERNAL MEDICINE

## 2021-08-05 PROCEDURE — 99214 OFFICE O/P EST MOD 30 MIN: CPT | Performed by: INTERNAL MEDICINE

## 2021-08-05 PROCEDURE — 36415 COLL VENOUS BLD VENIPUNCTURE: CPT | Performed by: INTERNAL MEDICINE

## 2021-08-05 PROCEDURE — 85027 COMPLETE CBC AUTOMATED: CPT | Performed by: INTERNAL MEDICINE

## 2021-08-05 RX ORDER — PREDNISOLONE ACETATE 10 MG/ML
1 SUSPENSION/ DROPS OPHTHALMIC 4 TIMES DAILY PRN
COMMUNITY
End: 2022-04-28

## 2021-08-05 ASSESSMENT — MIFFLIN-ST. JEOR: SCORE: 1115.19

## 2021-08-05 NOTE — PATIENT INSTRUCTIONS
Estrellita Herring,    It was a pleasure to see you today in the clinic regarding your interest in the Watchman device.     I will have the implanting doctors look at your CT images from 2019 and see if they are adequate for measuring your appendage.  If not, we will contact you to schedule a repeat screening study.       If you have questions or concerns, please call using the numbers below:    COLLIN Brice   [phone]    Julissa Dhaliwal RN  [phone]

## 2021-08-05 NOTE — LETTER
8/5/2021    Chai Quintero MD, MD  [facility] [address]  [address] [place] [address]    RE: Estrellita Herring       Dear Colleague,    I had the pleasure of seeing Estrellita Herring in the [facility].          Assessment/Recommendations   1.  Paroxysmal atrial fibrillation: S/p PVI ablation in 2019 and has had no recurrences since that time.  I have personally reviewed this patient's chart and have spoken with the patient about the treatment options, including FRANCISCO device.  She has a [id number] score of 4 for age greater than 75, female gender and hypertension.  She has a HAS-BLED score of 2 for age and bleeding disposition.   She is not a candidate for long-term anticoagulation due to gait imbalance and chronic anemia with no evidence of active bleeding at time of EGD or colonoscopy studies.  Patient has had about 18 inches of her colon removed in the past for inflammation and scar tissue and once had an abscess of the colon.  She also has diverticulitis putting her at higher risk for bleeds.  She underwent CT pulmonary vein study in 2019 prior to her ablation and we will send these images to the implanter's to see if they are adequate enough to size her appendage.  If the images are not adequate, patient understands that she will need to have a repeat screening study and because of this I will check BMP today to ensure normal kidney function    Once we know her anatomy is amenable, she can be scheduled for implant.  She will continue on Eliquis up until and through implant.  After implant, patient will be started on aspirin 81 mg daily along with her Eliquis.  Approximately 45 days after implant, she will have a post procedure VASQUEZ. If the post VASQUEZ is negative for leaks and no thrombus is seen on the surface of the device, she would be instructed to stop the Eliquis.  At that time he would continue the aspirin 81 mg and add Plavix 75 mg by  mouth daily for an additional 4 months.  After being on DAPT for approximately 4 months, she will stop the Plavix and continue on just aspirin 81 mg daily indefinitely.  She understands that the risks of the procedure are <2% and include, but are not limited to device embolization, air embolism, myocardial perforation, device thrombosis, ASD, stroke, or death.  We discussed expected recovery and follow-up.       The patient is a good candidate for proceeding with left atrial appendage screening and implant.  Her questions were answered to her satisfaction.     2.  Chronic anemia -secondary to chronic iron deficiency anemia and chronic blood loss (with unknown etiology).  Last labs I have access to in the chart are from 2020 and I will repeat CBC today.    3.  Ankylosing spondylitis and inflammatory arthritis -with long-term use of infliximab infusions    4.  Hypertension -blood pressure is controlled today.  Patient will continue taking metoprolol succinate 25 mg daily       History of Present Illness/Subjective    Estrellita Herring is a 81 year old female who comes in today for discussion regarding her interest in the left atrial appendage occlusion device.    Estrellita Herring has a past history of atrial fibrillation that was diagnosed in 2018.  She was treated with medications, but when meds failed was referred for ablation and underwent ablation in October 2019.  Since ablation she has had no recurrence of her arrhythmia.  She has been maintained on Eliquis for stroke prophylaxis, but has history of chronic anemia with hemoglobins as low as 8.3 from my review of her chart.  She was referred to the [facility]CO clinic by Dr. Strong late last year, but because she thomas in [place] she wanted to wait until her return this spring/summer to come in for a discussion with me.       She has had a lot of her medical care in [place] and we do not have full records because of that.  She tells me that she had at one time been diagnosed  "with inflammatory bowel and because of scar tissue had 18 inches of her colon removed.  She also was treated with IV antibiotics for an abscess of her colon.  She does have history of diverticulosis, but I do not see any reports for colonoscopy or EGD, although patient says that she has had both as part of her work-up in the past.    Estrellita Herring denies chest discomfort, palpitations, shortness of breath, paroxysmal nocturnal dyspnea, orthopnea, lightheadedness, dizziness, pre-syncope, or syncope.  Estrellita Herring also denies any weight loss, changes in appetite, nausea or vomiting.     Medical, surgical, family, social history, and medications were reviewed and updated as necessary.    CT Angio Pulmonary Vein study from September 2019 :  Report reviewed     Physical Examination Review of Systems   Vitals: BP (!) 140/68 (BP Location: Left arm, Patient Position: Sitting, Cuff Size: Adult Regular)   Pulse 68   Resp 16   Ht 1.638 m (5' 4.5\")   Wt 66.2 kg (146 lb)   BMI 24.67 kg/m    BMI= Body mass index is 24.67 kg/m .  Wt Readings from Last 3 Encounters:   08/05/21 66.2 kg (146 lb)   10/27/20 65.3 kg (144 lb)   11/14/19 67.1 kg (148 lb)       General Appearance:   Alert, cooperative and in no acute distress   ENT/Mouth: membranes moist, no oral lesions or bleeding gums.      EYES:  no scleral icterus, normal conjunctivae   Neck: Thyroid not visualized   Chest/Lungs:   lungs are clear to auscultation, no rales or wheezing   Cardiovascular:   Regular . Normal first and second heart sounds with no murmurs, rubs or gallops; the carotid, radial and posterior tibial pulses are intact, no edema bilaterally    Abdomen:  Soft and nontender. Bowel sounds are present in all quadrants   Extremities: no cyanosis or clubbing   Skin: no xanthelasma, warm.    Neurologic: normal gait, normal  bilateral, no tremors   Psychiatric: Normal mood and affect       Please refer above for cardiac ROS details.      Medical History "  Surgical History Family History Social History   Past Medical History:   Diagnosis Date     Ankylosis of spine      Atrial fibrillation (H)      Diverticulitis      Hypertension      Iritis      Past Surgical History:   Procedure Laterality Date     CATARACT EXTRACTION, BILATERAL       COLECTOMY  2006    partial     EP ABLATION  10/1/2019          EP ABLATION PVI Left 10/1/2019    Procedure: EP Ablation PVI;  Surgeon: Gudelia Strong MD;  Location: [facility];  Service: Cardiology     TOTAL KNEE ARTHROPLASTY Left      Family History   Problem Relation Age of Onset     Alzheimer Disease Mother      Cancer Father         prostate     Atrial fibrillation Sister      Diabetes Type 2  Sister     Social History     Socioeconomic History     Marital status:      Spouse name: Not on file     Number of children: Not on file     Years of education: Not on file     Highest education level: Not on file   Occupational History     Not on file   Tobacco Use     Smoking status: Never Smoker     Smokeless tobacco: Never Used   Substance and Sexual Activity     Alcohol use: No     Drug use: No     Sexual activity: Not on file   Other Topics Concern     Not on file   Social History Narrative     Not on file     Social Determinants of Health     Financial Resource Strain:      Difficulty of Paying Living Expenses:    Food Insecurity:      Worried About Running Out of Food in the Last Year:      Ran Out of Food in the Last Year:    Transportation Needs:      Lack of Transportation (Medical):      Lack of Transportation (Non-Medical):    Physical Activity:      Days of Exercise per Week:      Minutes of Exercise per Session:    Stress:      Feeling of Stress :    Social Connections:      Frequency of Communication with Friends and Family:      Frequency of Social Gatherings with Friends and Family:      Attends Restorationism Services:      Active Member of Clubs or Organizations:      Attends Club or Organization Meetings:       Marital Status:    Intimate Partner Violence:      Fear of Current or Ex-Partner:      Emotionally Abused:      Physically Abused:      Sexually Abused:           Medications  Allergies   Current Outpatient Medications   Medication Sig Dispense Refill     ELIQUIS 5 mg Tab tablet [ELIQUIS 5 MG TAB TABLET] TAKE 1 TABLET BY MOUTH EVERY 12 HOURS 180 tablet 1     inFLIXimab (REMICADE) 100 mg injection [INFLIXIMAB (REMICADE) 100 MG INJECTION] Infuse 400 mg into a venous catheter every 2 (two) months.       metoprolol succinate (TOPROL-XL) 25 MG [METOPROLOL SUCCINATE (TOPROL-XL) 25 MG] Take 1 tablet (25 mg total) by mouth daily. 90 tablet 3     omeprazole (PRILOSEC) 20 MG capsule [OMEPRAZOLE (PRILOSEC) 20 MG CAPSULE] TAKE 1 CAPSULE BY MOUTH ONCE DAILY OCCASIONALLY TWICE DAILY       prednisoLONE acetate (PREDNISOLONE ACETATE P-F) 1 % ophthalmic suspension Place 1 drop Into the left eye 4 times daily       vit C/E/Zn/coppr/lutein/zeaxan (PRESERVISION AREDS-2 ORAL) [VIT C/E/ZN/COPPR/LUTEIN/ZEAXAN (PRESERVISION AREDS-2 ORAL)] Take 1 tablet by mouth 2 (two) times a day.       calcium carbonate/vitamin D3 (CALCIUM+D ORAL) [CALCIUM CARBONATE/VITAMIN D3 (CALCIUM+D ORAL)] Take 1 tablet by mouth 2 (two) times a day. (Patient not taking: Reported on 8/5/2021)      Allergies   Allergen Reactions     Sulfa (Sulfonamide Antibiotics) [Sulfa Drugs] Rash     hallucinations         Lab Results    Chemistry/lipid CBC Cardiac Enzymes/BNP/TSH/INR   Recent Labs   Lab Test 10/30/20  1142   CHOL 155   HDL 32*   LDL 93   TRIG 151*     Recent Labs   Lab Test 10/30/20  1142 05/01/18  1619 04/06/17  1447   LDL 93 114 103     Recent Labs   Lab Test 10/30/20  1142      POTASSIUM 4.8   CHLORIDE 102   CO2 31   GLC 83   BUN 16   CR 0.80   GFRESTIMATED >60   RICK 9.7     Recent Labs   Lab Test 10/30/20  1142 10/21/19  1022 10/04/19  1058   CR 0.80 0.74 0.73     No results for input(s): A1C in the last 03482 hours. Recent Labs   Lab Test  10/21/19  1022   WBC 7.5   HGB 10.7*   HCT 36.5   MCV 92        Recent Labs   Lab Test 10/21/19  1022 10/04/19  1058 10/02/19  0532   HGB 10.7* 9.4* 8.3*    No results for input(s): TROPONINI in the last 95345 hours.  No results for input(s): BNP, NTBNPI, NTBNP in the last 89818 hours.  Recent Labs   Lab Test 10/22/18  1254   TSH 2.30     No results for input(s): INR in the last 09421 hours.     35 minutes spent on the date of encounter doing education, chart prep/review, review of outside records, review of test results, interpretation with above tests, patient visit and documentation.      This note has been dictated using voice recognition software. Any grammatical or context distortions are unintentional and inherent to the software.            Thank you for allowing me to participate in the care of your patient.      Sincerely,     Jackelyn Bear PA-C     [facility]  cc:   No referring provider defined for this encounter.

## 2021-08-05 NOTE — PROGRESS NOTES
Assessment/Recommendations   1.  Paroxysmal atrial fibrillation: S/p PVI ablation in 2019 and has had no recurrences since that time.  I have personally reviewed this patient's chart and have spoken with the patient about the treatment options, including FRANCISCO device.  She has a [id number] score of 4 for age greater than 75, female gender and hypertension.  She has a HAS-BLED score of 2 for age and bleeding disposition.   She is not a candidate for long-term anticoagulation due to gait imbalance and chronic anemia with no evidence of active bleeding at time of EGD or colonoscopy studies.  Patient has had about 18 inches of her colon removed in the past for inflammation and scar tissue and once had an abscess of the colon.  She also has diverticulitis putting her at higher risk for bleeds.  She underwent CT pulmonary vein study in 2019 prior to her ablation and we will send these images to the implanter's to see if they are adequate enough to size her appendage.  If the images are not adequate, patient understands that she will need to have a repeat screening study and because of this I will check BMP today to ensure normal kidney function    Once we know her anatomy is amenable, she can be scheduled for implant.  She will continue on Eliquis up until and through implant.  After implant, patient will be started on aspirin 81 mg daily along with her Eliquis.  Approximately 45 days after implant, she will have a post procedure VASQUEZ. If the post VASQUEZ is negative for leaks and no thrombus is seen on the surface of the device, she would be instructed to stop the Eliquis.  At that time he would continue the aspirin 81 mg and add Plavix 75 mg by mouth daily for an additional 4 months.  After being on DAPT for approximately 4 months, she will stop the Plavix and continue on just aspirin 81 mg daily indefinitely.  She understands that the risks of the procedure are <2% and include, but are not limited to device  embolization, air embolism, myocardial perforation, device thrombosis, ASD, stroke, or death.  We discussed expected recovery and follow-up.       The patient is a good candidate for proceeding with left atrial appendage screening and implant.  Her questions were answered to her satisfaction.     2.  Chronic anemia -secondary to chronic iron deficiency anemia and chronic blood loss (with unknown etiology).  Last labs I have access to in the chart are from 2020 and I will repeat CBC today.    3.  Ankylosing spondylitis and inflammatory arthritis -with long-term use of infliximab infusions    4.  Hypertension -blood pressure is controlled today.  Patient will continue taking metoprolol succinate 25 mg daily       History of Present Illness/Subjective    Estrellita Herring is a 81 year old female who comes in today for discussion regarding her interest in the left atrial appendage occlusion device.    Estrellita Herring has a past history of atrial fibrillation that was diagnosed in 2018.  She was treated with medications, but when meds failed was referred for ablation and underwent ablation in October 2019.  Since ablation she has had no recurrence of her arrhythmia.  She has been maintained on Eliquis for stroke prophylaxis, but has history of chronic anemia with hemoglobins as low as 8.3 from my review of her chart.  She was referred to the FRANCISCO clinic by Dr. Strong late last year, but because she thomas in [place] she wanted to wait until her return this spring/summer to come in for a discussion with me.       She has had a lot of her medical care in [place] and we do not have full records because of that.  She tells me that she had at one time been diagnosed with inflammatory bowel and because of scar tissue had 18 inches of her colon removed.  She also was treated with IV antibiotics for an abscess of her colon.  She does have history of diverticulosis, but I do not see any reports for colonoscopy or EGD, although patient  "says that she has had both as part of her work-up in the past.    Estrellita Herring denies chest discomfort, palpitations, shortness of breath, paroxysmal nocturnal dyspnea, orthopnea, lightheadedness, dizziness, pre-syncope, or syncope.  Estrellita Herring also denies any weight loss, changes in appetite, nausea or vomiting.     Medical, surgical, family, social history, and medications were reviewed and updated as necessary.    CT Angio Pulmonary Vein study from September 2019 :  Report reviewed     Physical Examination Review of Systems   Vitals: BP (!) 140/68 (BP Location: Left arm, Patient Position: Sitting, Cuff Size: Adult Regular)   Pulse 68   Resp 16   Ht 1.638 m (5' 4.5\")   Wt 66.2 kg (146 lb)   BMI 24.67 kg/m    BMI= Body mass index is 24.67 kg/m .  Wt Readings from Last 3 Encounters:   08/05/21 66.2 kg (146 lb)   10/27/20 65.3 kg (144 lb)   11/14/19 67.1 kg (148 lb)       General Appearance:   Alert, cooperative and in no acute distress   ENT/Mouth: membranes moist, no oral lesions or bleeding gums.      EYES:  no scleral icterus, normal conjunctivae   Neck: Thyroid not visualized   Chest/Lungs:   lungs are clear to auscultation, no rales or wheezing   Cardiovascular:   Regular . Normal first and second heart sounds with no murmurs, rubs or gallops; the carotid, radial and posterior tibial pulses are intact, no edema bilaterally    Abdomen:  Soft and nontender. Bowel sounds are present in all quadrants   Extremities: no cyanosis or clubbing   Skin: no xanthelasma, warm.    Neurologic: normal gait, normal  bilateral, no tremors   Psychiatric: Normal mood and affect       Please refer above for cardiac ROS details.      Medical History  Surgical History Family History Social History   Past Medical History:   Diagnosis Date     Ankylosis of spine      Atrial fibrillation (H)      Diverticulitis      Hypertension      Iritis      Past Surgical History:   Procedure Laterality Date     CATARACT " EXTRACTION, BILATERAL       COLECTOMY  2006    partial     EP ABLATION  10/1/2019          EP ABLATION PVI Left 10/1/2019    Procedure: EP Ablation PVI;  Surgeon: Gudelia Strong MD;  Location: [facility];  Service: Cardiology     TOTAL KNEE ARTHROPLASTY Left      Family History   Problem Relation Age of Onset     Alzheimer Disease Mother      Cancer Father         prostate     Atrial fibrillation Sister      Diabetes Type 2  Sister     Social History     Socioeconomic History     Marital status:      Spouse name: Not on file     Number of children: Not on file     Years of education: Not on file     Highest education level: Not on file   Occupational History     Not on file   Tobacco Use     Smoking status: Never Smoker     Smokeless tobacco: Never Used   Substance and Sexual Activity     Alcohol use: No     Drug use: No     Sexual activity: Not on file   Other Topics Concern     Not on file   Social History Narrative     Not on file     Social Determinants of Health     Financial Resource Strain:      Difficulty of Paying Living Expenses:    Food Insecurity:      Worried About Running Out of Food in the Last Year:      Ran Out of Food in the Last Year:    Transportation Needs:      Lack of Transportation (Medical):      Lack of Transportation (Non-Medical):    Physical Activity:      Days of Exercise per Week:      Minutes of Exercise per Session:    Stress:      Feeling of Stress :    Social Connections:      Frequency of Communication with Friends and Family:      Frequency of Social Gatherings with Friends and Family:      Attends Islam Services:      Active Member of Clubs or Organizations:      Attends Club or Organization Meetings:      Marital Status:    Intimate Partner Violence:      Fear of Current or Ex-Partner:      Emotionally Abused:      Physically Abused:      Sexually Abused:           Medications  Allergies   Current Outpatient Medications   Medication Sig Dispense Refill      ELIQUIS 5 mg Tab tablet [ELIQUIS 5 MG TAB TABLET] TAKE 1 TABLET BY MOUTH EVERY 12 HOURS 180 tablet 1     inFLIXimab (REMICADE) 100 mg injection [INFLIXIMAB (REMICADE) 100 MG INJECTION] Infuse 400 mg into a venous catheter every 2 (two) months.       metoprolol succinate (TOPROL-XL) 25 MG [METOPROLOL SUCCINATE (TOPROL-XL) 25 MG] Take 1 tablet (25 mg total) by mouth daily. 90 tablet 3     omeprazole (PRILOSEC) 20 MG capsule [OMEPRAZOLE (PRILOSEC) 20 MG CAPSULE] TAKE 1 CAPSULE BY MOUTH ONCE DAILY OCCASIONALLY TWICE DAILY       prednisoLONE acetate (PREDNISOLONE ACETATE P-F) 1 % ophthalmic suspension Place 1 drop Into the left eye 4 times daily       vit C/E/Zn/coppr/lutein/zeaxan (PRESERVISION AREDS-2 ORAL) [VIT C/E/ZN/COPPR/LUTEIN/ZEAXAN (PRESERVISION AREDS-2 ORAL)] Take 1 tablet by mouth 2 (two) times a day.       calcium carbonate/vitamin D3 (CALCIUM+D ORAL) [CALCIUM CARBONATE/VITAMIN D3 (CALCIUM+D ORAL)] Take 1 tablet by mouth 2 (two) times a day. (Patient not taking: Reported on 8/5/2021)      Allergies   Allergen Reactions     Sulfa (Sulfonamide Antibiotics) [Sulfa Drugs] Rash     hallucinations         Lab Results    Chemistry/lipid CBC Cardiac Enzymes/BNP/TSH/INR   Recent Labs   Lab Test 10/30/20  1142   CHOL 155   HDL 32*   LDL 93   TRIG 151*     Recent Labs   Lab Test 10/30/20  1142 05/01/18  1619 04/06/17  1447   LDL 93 114 103     Recent Labs   Lab Test 10/30/20  1142      POTASSIUM 4.8   CHLORIDE 102   CO2 31   GLC 83   BUN 16   CR 0.80   GFRESTIMATED >60   RICK 9.7     Recent Labs   Lab Test 10/30/20  1142 10/21/19  1022 10/04/19  1058   CR 0.80 0.74 0.73     No results for input(s): A1C in the last 81219 hours. Recent Labs   Lab Test 10/21/19  1022   WBC 7.5   HGB 10.7*   HCT 36.5   MCV 92        Recent Labs   Lab Test 10/21/19  1022 10/04/19  1058 10/02/19  0532   HGB 10.7* 9.4* 8.3*    No results for input(s): TROPONINI in the last 33382 hours.  No results for input(s): BNP, NTBNPI, NTBNP  in the last 06125 hours.  Recent Labs   Lab Test 10/22/18  1254   TSH 2.30     No results for input(s): INR in the last 09208 hours.     35 minutes spent on the date of encounter doing education, chart prep/review, review of outside records, review of test results, interpretation with above tests, patient visit and documentation.      This note has been dictated using voice recognition software. Any grammatical or context distortions are unintentional and inherent to the software.

## 2021-09-07 ENCOUNTER — TELEPHONE (OUTPATIENT)
Dept: CARDIOLOGY | Facility: CLINIC | Age: 81
End: 2021-09-07

## 2021-09-07 DIAGNOSIS — I48.0 PAROXYSMAL ATRIAL FIBRILLATION (H): Primary | ICD-10-CM

## 2021-09-07 NOTE — TELEPHONE ENCOUNTER
Pt interested in LAAC workup.     Please review CT Pulm Vein from 9/17/2019 to evaluate FRANCISCO.    Pt ok to proceed with LAAC procedure?    Thanks,  Jacinda

## 2021-09-14 NOTE — TELEPHONE ENCOUNTER
Pt called and discussed results. Pt would like to move forward with LAAC on 10/13. Discussed the pre and post procedure expectations. Pt understands she will need a SDM visit. Will have Yanira call to setup. Patient verbalizes understanding and agrees with plan. No further questions or concerns at this time.

## 2021-09-14 NOTE — TELEPHONE ENCOUNTER
From: Luiz Coronel MD  Sent: 9/13/2021   8:09 AM CDT  To: CLIFTON MORALES, Andrey Velázquez MD, *    Pre-Left Atrial Appendage Occlusion Device Review:        Cardiac CT was reviewed as part of pre-evaluation for left atrial appendage occlusion device placement.     Maximal orifice diameter: 27 mm   Maximal FRANCISCO depth: 35 mm   No apparent thrombus in the appendage.    Conclusion:  The left atrial appendage orifice diameter and depth is adequate for endocardial left atrial appendage occlusion device placement.   Okay to move forward with scheduling patient.

## 2021-09-21 DIAGNOSIS — Z11.59 ENCOUNTER FOR SCREENING FOR OTHER VIRAL DISEASES: ICD-10-CM

## 2021-09-27 ENCOUNTER — OFFICE VISIT (OUTPATIENT)
Dept: CARDIOLOGY | Facility: CLINIC | Age: 81
End: 2021-09-27
Payer: COMMERCIAL

## 2021-09-27 VITALS
SYSTOLIC BLOOD PRESSURE: 140 MMHG | BODY MASS INDEX: 24.46 KG/M2 | HEART RATE: 76 BPM | DIASTOLIC BLOOD PRESSURE: 70 MMHG | RESPIRATION RATE: 24 BRPM | HEIGHT: 65 IN | WEIGHT: 146.8 LBS

## 2021-09-27 DIAGNOSIS — D50.9 IRON DEFICIENCY ANEMIA, UNSPECIFIED IRON DEFICIENCY ANEMIA TYPE: ICD-10-CM

## 2021-09-27 DIAGNOSIS — I10 BENIGN ESSENTIAL HYPERTENSION: ICD-10-CM

## 2021-09-27 DIAGNOSIS — I48.19 PERSISTENT ATRIAL FIBRILLATION (H): Primary | ICD-10-CM

## 2021-09-27 PROCEDURE — 99214 OFFICE O/P EST MOD 30 MIN: CPT | Performed by: INTERNAL MEDICINE

## 2021-09-27 ASSESSMENT — MIFFLIN-ST. JEOR: SCORE: 1122.79

## 2021-09-27 NOTE — LETTER
9/27/2021    Chai Quintero MD, MD  [facility] [address]  [place] [address]    RE: Estrellita Herring       Dear Colleague,    I had the pleasure of seeing Estrellita Herring in the [facility].        Thank you, Chai Smith, for asking the [facility] team to see Ms. Estrellita Herring to evaluate Follow Up (afib)      Assessment/Recommendations   Assessment:    1. Paroxysmal atrial fibrillation - s/p PVI ablation in 2019 without symptomatic recurrence . On Eliquis for anticoagulation  2. Chronic anemia - overall stable.  3. Ankylosing spondylitis and inflammatory arthritis - with long-term use of infliximab  4. Hypertension - BP mildly elevated today    Plan:  1. Agree with proceeding with Watchman FRANCISCO closure.  2. No changes in medications  3. Follow up in 1 year or sooner if needed.    Estrellita Herring has documented nonvalvular atrial fibrillation (NVAF) and is currently on oral anticoagulant therapy Eliquis   [id number] -VASc = 4 (age x2, female sex, hypertension)   HAS-BLED risk score: 2 (age, bleeding disposition)  Indication(s) to consider non-oral anticoagulation therapy: chronic iron deficiency anemia with diverticulitis.  Pt has no contra-indication to come off oral anti-coagulant therapy if LAAC device is successfully placed.     I have personally reviewed the patients chart and discussed the following with the patient/family; 1) The choices available for reducing stroke risk from atrial fibrillation, 2) Treatment options available including respective risk/benefits, and 3) What factors are most important for the patient in making their decision.  The [facility] shared decision making tool https://www.cardiosmart.org/SDM/Decision-Aids/Find-Decision-Aids/Atrial-Fibrillation  was used to guide this conversation.   The patient was counseled that their decision could be made at this time or in the future if  more time was needed to consider their decision.     The patient is an appropriate candidate to proceed with left atrial appendage screening and implant.              History of Present Illness   Ms. Estrellita Herring is a 81 year old female with a significant past history of persistent atrial fibrillation, hypertension, ankylosing spondylitis, and chronic anemia who presents for discussion of the watchman left atrial appendage closure device.     Ms. Herring had her atrial fibrillation diagnosed in 2018.  She was initially treated with medication however continued to have breakthrough A. fib.  She underwent pulmonary vein isolation in October 2019.  Since that time she has not had any breakthrough atrial fibrillation.  She also has chronic iron deficiency anemia without identifiable source of overt bleeding.  She does have diverticulitis which does increase her risk of GI bleed.  She is considering watchman left atrial appendage closure due to her chronic anemia as well as some gait instability and risk of falls.    She currently feels generally well and reports no new symptoms of afib.       Other than noted above, Ms. Herring denies any chest pain/pressure/tightness, shortness of breath at rest or with exertion, light headedness/dizziness, pre-syncope, syncope, lower extremity swelling, palpitations, paroxysmal nocturnal dyspnea (PND), or orthopnea.     Cardiac Problems and Cardiac Diagnostics     Most Recent Cardiac testing:    ECHO (report reviewed):   TTE 11/7/18    Left ventricle ejection fraction is normal. The calculated left ventricular ejection fraction is 56%.    Normal right ventricular size and systolic function.    No hemodynamically significant valvular heart abnormalities.    No previous study for comparison.    Stress test: dated 8/30/19 revealed     The pharmacologic nuclear stress test is negative for inducible myocardial ischemia or infarction.    The left ventricular ejection fraction is  "81%.    Exercise was attempted but the patient was not able to exercise beyond 5 minutes and attain target heart rate.    There is no prior study available.         Medications  Allergies   Current Outpatient Medications   Medication Sig Dispense Refill     ELIQUIS 5 mg Tab tablet [ELIQUIS 5 MG TAB TABLET] TAKE 1 TABLET BY MOUTH EVERY 12 HOURS 180 tablet 1     inFLIXimab (REMICADE) 100 mg injection [INFLIXIMAB (REMICADE) 100 MG INJECTION] Infuse 400 mg into a venous catheter every 2 (two) months.       metoprolol succinate (TOPROL-XL) 25 MG [METOPROLOL SUCCINATE (TOPROL-XL) 25 MG] Take 1 tablet (25 mg total) by mouth daily. 90 tablet 3     omeprazole (PRILOSEC) 20 MG capsule [OMEPRAZOLE (PRILOSEC) 20 MG CAPSULE] TAKE 1 CAPSULE BY MOUTH ONCE DAILY OCCASIONALLY TWICE DAILY       vit C/E/Zn/coppr/lutein/zeaxan (PRESERVISION AREDS-2 ORAL) [VIT C/E/ZN/COPPR/LUTEIN/ZEAXAN (PRESERVISION AREDS-2 ORAL)] Take 1 tablet by mouth 2 (two) times a day.       calcium carbonate/vitamin D3 (CALCIUM+D ORAL) [CALCIUM CARBONATE/VITAMIN D3 (CALCIUM+D ORAL)] Take 1 tablet by mouth 2 (two) times a day. (Patient not taking: Reported on 8/5/2021)       prednisoLONE acetate (PREDNISOLONE ACETATE P-F) 1 % ophthalmic suspension Place 1 drop Into the left eye 4 times daily (Patient not taking: Reported on 9/27/2021)        Allergies   Allergen Reactions     Sulfa (Sulfonamide Antibiotics) [Sulfa Drugs] Rash     hallucinations        Physical Examination Review of Systems   Vitals: BP (!) 140/70 (BP Location: Left arm, Patient Position: Sitting, Cuff Size: Adult Regular)   Pulse 76   Resp 24   Ht 1.645 m (5' 4.75\")   Wt 66.6 kg (146 lb 12.8 oz)   BMI 24.62 kg/m    BMI= Body mass index is 24.62 kg/m .  Wt Readings from Last 3 Encounters:   09/27/21 66.6 kg (146 lb 12.8 oz)   08/05/21 66.2 kg (146 lb)   10/27/20 65.3 kg (144 lb)       General Appearance:   Pleasant female, appears stated age. no acute distress, normal body habitus   ENT/Mouth: " membranes moist, no apparent gingival bleeding.      EYES:  no scleral icterus, normal conjunctivae   Neck: no carotid bruits. supple   Respiratory:   lungs are clear to auscultation, no rales or wheezing, equal chest wall expansion    Cardiovascular:   Regular rhythm, normal rate. Normal first and second heart sounds with no murmurs, rubs, or gallops; Jugular venous pressure normal, no edema bilaterally    Abdomen/GI:  Soft, non-tender   Extremities: no cyanosis or clubbing   Skin: no xanthelasma, warm.    Heme/lymph/ Immunology No apparent bleeding noted.   Neurologic: Alert and oriented. normal gait, no tremors   Psychiatric: Pleasant, calm, appropriate affect.         Please refer above for cardiac ROS details.       Past History   Past Medical History:   Past Medical History:   Diagnosis Date     Ankylosis of spine      Atrial fibrillation (H)      Diverticulitis      Hypertension      Iritis         Past Surgical History:   Past Surgical History:   Procedure Laterality Date     CATARACT EXTRACTION, BILATERAL       COLECTOMY  2006    partial     EP ABLATION  10/1/2019          EP ABLATION PVI Left 10/1/2019    Procedure: EP Ablation PVI;  Surgeon: Gudelia Strong MD;  Location: [facility];  Service: Cardiology     TOTAL KNEE ARTHROPLASTY Left         Family History:   Family History   Problem Relation Age of Onset     Alzheimer Disease Mother      Cancer Father         prostate     Atrial fibrillation Sister      Diabetes Type 2  Sister         Social History:   Social History     Socioeconomic History     Marital status:      Spouse name: Not on file     Number of children: Not on file     Years of education: Not on file     Highest education level: Not on file   Occupational History     Not on file   Tobacco Use     Smoking status: Never Smoker     Smokeless tobacco: Never Used   Substance and Sexual Activity     Alcohol use: No     Drug use: No     Sexual activity: Not on file   Other Topics  Concern     Not on file   Social History Narrative     Not on file     Social Determinants of Health     Financial Resource Strain:      Difficulty of Paying Living Expenses:    Food Insecurity:      Worried About Running Out of Food in the Last Year:      Ran Out of Food in the Last Year:    Transportation Needs:      Lack of Transportation (Medical):      Lack of Transportation (Non-Medical):    Physical Activity:      Days of Exercise per Week:      Minutes of Exercise per Session:    Stress:      Feeling of Stress :    Social Connections:      Frequency of Communication with Friends and Family:      Frequency of Social Gatherings with Friends and Family:      Attends Temple Services:      Active Member of Clubs or Organizations:      Attends Club or Organization Meetings:      Marital Status:    Intimate Partner Violence:      Fear of Current or Ex-Partner:      Emotionally Abused:      Physically Abused:      Sexually Abused:             Lab Results    Chemistry/lipid CBC Cardiac Enzymes/BNP/TSH/INR   Lab Results   Component Value Date    CHOL 155 10/30/2020    HDL 32 (L) 10/30/2020    TRIG 151 (H) 10/30/2020    BUN 13 08/05/2021     08/05/2021    CO2 27 08/05/2021    Lab Results   Component Value Date    WBC 7.0 08/05/2021    HGB 10.4 (L) 08/05/2021    HCT 34.7 (L) 08/05/2021    MCV 85 08/05/2021     08/05/2021    Lab Results   Component Value Date    TSH 2.30 10/22/2018                Thank you for allowing me to participate in the care of your patient.      Sincerely,     Prasad Chowdhury MD     [facility]  cc:   No referring provider defined for this encounter.

## 2021-09-27 NOTE — PROGRESS NOTES
Thank you, Chai Smith, for asking the [facility] team to see Ms. Estrellita Herring to evaluate Follow Up (afib)      Assessment/Recommendations   Assessment:    1. Paroxysmal atrial fibrillation - s/p PVI ablation in 2019 without symptomatic recurrence . On Eliquis for anticoagulation  2. Chronic anemia - overall stable.  3. Ankylosing spondylitis and inflammatory arthritis - with long-term use of infliximab  4. Hypertension - BP mildly elevated today    Plan:  1. Agree with proceeding with Watchman FRANCISCO closure.  2. No changes in medications  3. Follow up in 1 year or sooner if needed.    Estrellita Herring has documented nonvalvular atrial fibrillation (NVAF) and is currently on oral anticoagulant therapy Eliquis   [id number] -VASc = 4 (age x2, female sex, hypertension)   HAS-BLED risk score: 2 (age, bleeding disposition)  Indication(s) to consider non-oral anticoagulation therapy: chronic iron deficiency anemia with diverticulitis.  Pt has no contra-indication to come off oral anti-coagulant therapy if LAAC device is successfully placed.     I have personally reviewed the patients chart and discussed the following with the patient/family; 1) The choices available for reducing stroke risk from atrial fibrillation, 2) Treatment options available including respective risk/benefits, and 3) What factors are most important for the patient in making their decision.  The ACC shared decision making tool https://www.cardiosmart.org/SDM/Decision-Aids/Find-Decision-Aids/Atrial-Fibrillation  was used to guide this conversation.   The patient was counseled that their decision could be made at this time or in the future if more time was needed to consider their decision.     The patient is an appropriate candidate to proceed with left atrial appendage screening and implant.              History of Present Illness   Ms. Estrellita Herring is a 81 year old female with a significant past history of  persistent atrial fibrillation, hypertension, ankylosing spondylitis, and chronic anemia who presents for discussion of the watchman left atrial appendage closure device.     Ms. Herring had her atrial fibrillation diagnosed in 2018.  She was initially treated with medication however continued to have breakthrough A. fib.  She underwent pulmonary vein isolation in October 2019.  Since that time she has not had any breakthrough atrial fibrillation.  She also has chronic iron deficiency anemia without identifiable source of overt bleeding.  She does have diverticulitis which does increase her risk of GI bleed.  She is considering watchman left atrial appendage closure due to her chronic anemia as well as some gait instability and risk of falls.    She currently feels generally well and reports no new symptoms of afib.       Other than noted above, Ms. Herring denies any chest pain/pressure/tightness, shortness of breath at rest or with exertion, light headedness/dizziness, pre-syncope, syncope, lower extremity swelling, palpitations, paroxysmal nocturnal dyspnea (PND), or orthopnea.     Cardiac Problems and Cardiac Diagnostics     Most Recent Cardiac testing:    ECHO (report reviewed):   TTE 11/7/18    Left ventricle ejection fraction is normal. The calculated left ventricular ejection fraction is 56%.    Normal right ventricular size and systolic function.    No hemodynamically significant valvular heart abnormalities.    No previous study for comparison.    Stress test: dated 8/30/19 revealed     The pharmacologic nuclear stress test is negative for inducible myocardial ischemia or infarction.    The left ventricular ejection fraction is 81%.    Exercise was attempted but the patient was not able to exercise beyond 5 minutes and attain target heart rate.    There is no prior study available.         Medications  Allergies   Current Outpatient Medications   Medication Sig Dispense Refill     ELIQUIS 5 mg Tab tablet  "[ELIQUIS 5 MG TAB TABLET] TAKE 1 TABLET BY MOUTH EVERY 12 HOURS 180 tablet 1     inFLIXimab (REMICADE) 100 mg injection [INFLIXIMAB (REMICADE) 100 MG INJECTION] Infuse 400 mg into a venous catheter every 2 (two) months.       metoprolol succinate (TOPROL-XL) 25 MG [METOPROLOL SUCCINATE (TOPROL-XL) 25 MG] Take 1 tablet (25 mg total) by mouth daily. 90 tablet 3     omeprazole (PRILOSEC) 20 MG capsule [OMEPRAZOLE (PRILOSEC) 20 MG CAPSULE] TAKE 1 CAPSULE BY MOUTH ONCE DAILY OCCASIONALLY TWICE DAILY       vit C/E/Zn/coppr/lutein/zeaxan (PRESERVISION AREDS-2 ORAL) [VIT C/E/ZN/COPPR/LUTEIN/ZEAXAN (PRESERVISION AREDS-2 ORAL)] Take 1 tablet by mouth 2 (two) times a day.       calcium carbonate/vitamin D3 (CALCIUM+D ORAL) [CALCIUM CARBONATE/VITAMIN D3 (CALCIUM+D ORAL)] Take 1 tablet by mouth 2 (two) times a day. (Patient not taking: Reported on 8/5/2021)       prednisoLONE acetate (PREDNISOLONE ACETATE P-F) 1 % ophthalmic suspension Place 1 drop Into the left eye 4 times daily (Patient not taking: Reported on 9/27/2021)        Allergies   Allergen Reactions     Sulfa (Sulfonamide Antibiotics) [Sulfa Drugs] Rash     hallucinations        Physical Examination Review of Systems   Vitals: BP (!) 140/70 (BP Location: Left arm, Patient Position: Sitting, Cuff Size: Adult Regular)   Pulse 76   Resp 24   Ht 1.645 m (5' 4.75\")   Wt 66.6 kg (146 lb 12.8 oz)   BMI 24.62 kg/m    BMI= Body mass index is 24.62 kg/m .  Wt Readings from Last 3 Encounters:   09/27/21 66.6 kg (146 lb 12.8 oz)   08/05/21 66.2 kg (146 lb)   10/27/20 65.3 kg (144 lb)       General Appearance:   Pleasant female, appears stated age. no acute distress, normal body habitus   ENT/Mouth: membranes moist, no apparent gingival bleeding.      EYES:  no scleral icterus, normal conjunctivae   Neck: no carotid bruits. supple   Respiratory:   lungs are clear to auscultation, no rales or wheezing, equal chest wall expansion    Cardiovascular:   Regular rhythm, normal " rate. Normal first and second heart sounds with no murmurs, rubs, or gallops; Jugular venous pressure normal, no edema bilaterally    Abdomen/GI:  Soft, non-tender   Extremities: no cyanosis or clubbing   Skin: no xanthelasma, warm.    Heme/lymph/ Immunology No apparent bleeding noted.   Neurologic: Alert and oriented. normal gait, no tremors   Psychiatric: Pleasant, calm, appropriate affect.         Please refer above for cardiac ROS details.       Past History   Past Medical History:   Past Medical History:   Diagnosis Date     Ankylosis of spine      Atrial fibrillation (H)      Diverticulitis      Hypertension      Iritis         Past Surgical History:   Past Surgical History:   Procedure Laterality Date     CATARACT EXTRACTION, BILATERAL       COLECTOMY  2006    partial     EP ABLATION  10/1/2019          EP ABLATION PVI Left 10/1/2019    Procedure: EP Ablation PVI;  Surgeon: Gudelia Strong MD;  Location: [facility];  Service: Cardiology     TOTAL KNEE ARTHROPLASTY Left         Family History:   Family History   Problem Relation Age of Onset     Alzheimer Disease Mother      Cancer Father         prostate     Atrial fibrillation Sister      Diabetes Type 2  Sister         Social History:   Social History     Socioeconomic History     Marital status:      Spouse name: Not on file     Number of children: Not on file     Years of education: Not on file     Highest education level: Not on file   Occupational History     Not on file   Tobacco Use     Smoking status: Never Smoker     Smokeless tobacco: Never Used   Substance and Sexual Activity     Alcohol use: No     Drug use: No     Sexual activity: Not on file   Other Topics Concern     Not on file   Social History Narrative     Not on file     Social Determinants of Health     Financial Resource Strain:      Difficulty of Paying Living Expenses:    Food Insecurity:      Worried About Running Out of Food in the Last Year:      Ran Out of Food in  the Last Year:    Transportation Needs:      Lack of Transportation (Medical):      Lack of Transportation (Non-Medical):    Physical Activity:      Days of Exercise per Week:      Minutes of Exercise per Session:    Stress:      Feeling of Stress :    Social Connections:      Frequency of Communication with Friends and Family:      Frequency of Social Gatherings with Friends and Family:      Attends Yarsani Services:      Active Member of Clubs or Organizations:      Attends Club or Organization Meetings:      Marital Status:    Intimate Partner Violence:      Fear of Current or Ex-Partner:      Emotionally Abused:      Physically Abused:      Sexually Abused:             Lab Results    Chemistry/lipid CBC Cardiac Enzymes/BNP/TSH/INR   Lab Results   Component Value Date    CHOL 155 10/30/2020    HDL 32 (L) 10/30/2020    TRIG 151 (H) 10/30/2020    BUN 13 08/05/2021     08/05/2021    CO2 27 08/05/2021    Lab Results   Component Value Date    WBC 7.0 08/05/2021    HGB 10.4 (L) 08/05/2021    HCT 34.7 (L) 08/05/2021    MCV 85 08/05/2021     08/05/2021    Lab Results   Component Value Date    TSH 2.30 10/22/2018

## 2021-09-27 NOTE — H&P (VIEW-ONLY)
Thank you, Chai Smith, for asking the [facility] team to see Ms. Estrellita Herring to evaluate Follow Up (afib)      Assessment/Recommendations   Assessment:    Paroxysmal atrial fibrillation - s/p PVI ablation in 2019 without symptomatic recurrence . On Eliquis for anticoagulation  Chronic anemia - overall stable.  Ankylosing spondylitis and inflammatory arthritis - with long-term use of infliximab  Hypertension - BP mildly elevated today    Plan:  Agree with proceeding with Watchman FRANCISCO closure.  No changes in medications  Follow up in 1 year or sooner if needed.    Estrellita Herring has documented nonvalvular atrial fibrillation (NVAF) and is currently on oral anticoagulant therapy Eliquis   [id number] -VASc = 4 (age x2, female sex, hypertension)   HAS-BLED risk score: 2 (age, bleeding disposition)  Indication(s) to consider non-oral anticoagulation therapy: chronic iron deficiency anemia with diverticulitis.  Pt has no contra-indication to come off oral anti-coagulant therapy if LAAC device is successfully placed.     I have personally reviewed the patients chart and discussed the following with the patient/family; 1) The choices available for reducing stroke risk from atrial fibrillation, 2) Treatment options available including respective risk/benefits, and 3) What factors are most important for the patient in making their decision.  The ACC shared decision making tool https://www.cardiosmart.org/SDM/Decision-Aids/Find-Decision-Aids/Atrial-Fibrillation  was used to guide this conversation.   The patient was counseled that their decision could be made at this time or in the future if more time was needed to consider their decision.     The patient is an appropriate candidate to proceed with left atrial appendage screening and implant.              History of Present Illness   Ms. Estrellita Herring is a 81 year old female with a significant past history of persistent atrial  fibrillation, hypertension, ankylosing spondylitis, and chronic anemia who presents for discussion of the watchman left atrial appendage closure device.     Ms. Herring had her atrial fibrillation diagnosed in 2018.  She was initially treated with medication however continued to have breakthrough A. fib.  She underwent pulmonary vein isolation in October 2019.  Since that time she has not had any breakthrough atrial fibrillation.  She also has chronic iron deficiency anemia without identifiable source of overt bleeding.  She does have diverticulitis which does increase her risk of GI bleed.  She is considering watchman left atrial appendage closure due to her chronic anemia as well as some gait instability and risk of falls.    She currently feels generally well and reports no new symptoms of afib.       Other than noted above, Ms. Herring denies any chest pain/pressure/tightness, shortness of breath at rest or with exertion, light headedness/dizziness, pre-syncope, syncope, lower extremity swelling, palpitations, paroxysmal nocturnal dyspnea (PND), or orthopnea.     Cardiac Problems and Cardiac Diagnostics     Most Recent Cardiac testing:    ECHO (report reviewed):   TTE 11/7/18    Left ventricle ejection fraction is normal. The calculated left ventricular ejection fraction is 56%.    Normal right ventricular size and systolic function.    No hemodynamically significant valvular heart abnormalities.    No previous study for comparison.    Stress test: dated 8/30/19 revealed     The pharmacologic nuclear stress test is negative for inducible myocardial ischemia or infarction.    The left ventricular ejection fraction is 81%.    Exercise was attempted but the patient was not able to exercise beyond 5 minutes and attain target heart rate.    There is no prior study available.         Medications  Allergies   Current Outpatient Medications   Medication Sig Dispense Refill     ELIQUIS 5 mg Tab tablet [ELIQUIS 5 MG TAB  "TABLET] TAKE 1 TABLET BY MOUTH EVERY 12 HOURS 180 tablet 1     inFLIXimab (REMICADE) 100 mg injection [INFLIXIMAB (REMICADE) 100 MG INJECTION] Infuse 400 mg into a venous catheter every 2 (two) months.       metoprolol succinate (TOPROL-XL) 25 MG [METOPROLOL SUCCINATE (TOPROL-XL) 25 MG] Take 1 tablet (25 mg total) by mouth daily. 90 tablet 3     omeprazole (PRILOSEC) 20 MG capsule [OMEPRAZOLE (PRILOSEC) 20 MG CAPSULE] TAKE 1 CAPSULE BY MOUTH ONCE DAILY OCCASIONALLY TWICE DAILY       vit C/E/Zn/coppr/lutein/zeaxan (PRESERVISION AREDS-2 ORAL) [VIT C/E/ZN/COPPR/LUTEIN/ZEAXAN (PRESERVISION AREDS-2 ORAL)] Take 1 tablet by mouth 2 (two) times a day.       calcium carbonate/vitamin D3 (CALCIUM+D ORAL) [CALCIUM CARBONATE/VITAMIN D3 (CALCIUM+D ORAL)] Take 1 tablet by mouth 2 (two) times a day. (Patient not taking: Reported on 8/5/2021)       prednisoLONE acetate (PREDNISOLONE ACETATE P-F) 1 % ophthalmic suspension Place 1 drop Into the left eye 4 times daily (Patient not taking: Reported on 9/27/2021)        Allergies   Allergen Reactions     Sulfa (Sulfonamide Antibiotics) [Sulfa Drugs] Rash     hallucinations        Physical Examination Review of Systems   Vitals: BP (!) 140/70 (BP Location: Left arm, Patient Position: Sitting, Cuff Size: Adult Regular)   Pulse 76   Resp 24   Ht 1.645 m (5' 4.75\")   Wt 66.6 kg (146 lb 12.8 oz)   BMI 24.62 kg/m    BMI= Body mass index is 24.62 kg/m .  Wt Readings from Last 3 Encounters:   09/27/21 66.6 kg (146 lb 12.8 oz)   08/05/21 66.2 kg (146 lb)   10/27/20 65.3 kg (144 lb)       General Appearance:   Pleasant female, appears stated age. no acute distress, normal body habitus   ENT/Mouth: membranes moist, no apparent gingival bleeding.      EYES:  no scleral icterus, normal conjunctivae   Neck: no carotid bruits. supple   Respiratory:   lungs are clear to auscultation, no rales or wheezing, equal chest wall expansion    Cardiovascular:   Regular rhythm, normal rate. Normal first and " second heart sounds with no murmurs, rubs, or gallops; Jugular venous pressure normal, no edema bilaterally    Abdomen/GI:  Soft, non-tender   Extremities: no cyanosis or clubbing   Skin: no xanthelasma, warm.    Heme/lymph/ Immunology No apparent bleeding noted.   Neurologic: Alert and oriented. normal gait, no tremors   Psychiatric: Pleasant, calm, appropriate affect.         Please refer above for cardiac ROS details.       Past History   Past Medical History:   Past Medical History:   Diagnosis Date     Ankylosis of spine      Atrial fibrillation (H)      Diverticulitis      Hypertension      Iritis         Past Surgical History:   Past Surgical History:   Procedure Laterality Date     CATARACT EXTRACTION, BILATERAL       COLECTOMY  2006    partial     EP ABLATION  10/1/2019          EP ABLATION PVI Left 10/1/2019    Procedure: EP Ablation PVI;  Surgeon: Gudelia Strong MD;  Location: [facility];  Service: Cardiology     TOTAL KNEE ARTHROPLASTY Left         Family History:   Family History   Problem Relation Age of Onset     Alzheimer Disease Mother      Cancer Father         prostate     Atrial fibrillation Sister      Diabetes Type 2  Sister         Social History:   Social History     Socioeconomic History     Marital status:      Spouse name: Not on file     Number of children: Not on file     Years of education: Not on file     Highest education level: Not on file   Occupational History     Not on file   Tobacco Use     Smoking status: Never Smoker     Smokeless tobacco: Never Used   Substance and Sexual Activity     Alcohol use: No     Drug use: No     Sexual activity: Not on file   Other Topics Concern     Not on file   Social History Narrative     Not on file     Social Determinants of Health     Financial Resource Strain:      Difficulty of Paying Living Expenses:    Food Insecurity:      Worried About Running Out of Food in the Last Year:      Ran Out of Food in the Last Year:     Transportation Needs:      Lack of Transportation (Medical):      Lack of Transportation (Non-Medical):    Physical Activity:      Days of Exercise per Week:      Minutes of Exercise per Session:    Stress:      Feeling of Stress :    Social Connections:      Frequency of Communication with Friends and Family:      Frequency of Social Gatherings with Friends and Family:      Attends Zoroastrianism Services:      Active Member of Clubs or Organizations:      Attends Club or Organization Meetings:      Marital Status:    Intimate Partner Violence:      Fear of Current or Ex-Partner:      Emotionally Abused:      Physically Abused:      Sexually Abused:             Lab Results    Chemistry/lipid CBC Cardiac Enzymes/BNP/TSH/INR   Lab Results   Component Value Date    CHOL 155 10/30/2020    HDL 32 (L) 10/30/2020    TRIG 151 (H) 10/30/2020    BUN 13 08/05/2021     08/05/2021    CO2 27 08/05/2021    Lab Results   Component Value Date    WBC 7.0 08/05/2021    HGB 10.4 (L) 08/05/2021    HCT 34.7 (L) 08/05/2021    MCV 85 08/05/2021     08/05/2021    Lab Results   Component Value Date    TSH 2.30 10/22/2018

## 2021-09-27 NOTE — PATIENT INSTRUCTIONS
It was a pleasure to meet with you today.      Below is a summary of your visit.   1. I agree that the Watchman procedure would be a good idea for you and provide you the ability to stop Eliquis.  2. Continue your medications without changes.  3. Follow up with me in about a year or sooner if needed.       Please do not hesitate to call the [facility] clinic with any questions or concerns at [phone]. You can also reach my nurse, Ambar, during normal business hours at [phone].    Sincerely,

## 2021-10-08 DIAGNOSIS — I48.0 PAROXYSMAL ATRIAL FIBRILLATION (H): Primary | ICD-10-CM

## 2021-10-11 ENCOUNTER — ALLIED HEALTH/NURSE VISIT (OUTPATIENT)
Dept: CARDIOLOGY | Facility: CLINIC | Age: 81
End: 2021-10-11

## 2021-10-11 ENCOUNTER — LAB (OUTPATIENT)
Dept: CARDIOLOGY | Facility: CLINIC | Age: 81
End: 2021-10-11
Payer: COMMERCIAL

## 2021-10-11 ENCOUNTER — PREP FOR PROCEDURE (OUTPATIENT)
Dept: CARDIOLOGY | Facility: CLINIC | Age: 81
End: 2021-10-11

## 2021-10-11 ENCOUNTER — LAB (OUTPATIENT)
Dept: LAB | Facility: CLINIC | Age: 81
End: 2021-10-11
Payer: COMMERCIAL

## 2021-10-11 ENCOUNTER — DOCUMENTATION ONLY (OUTPATIENT)
Dept: CARDIOLOGY | Facility: CLINIC | Age: 81
End: 2021-10-11

## 2021-10-11 ENCOUNTER — OFFICE VISIT (OUTPATIENT)
Dept: CARDIOLOGY | Facility: CLINIC | Age: 81
End: 2021-10-11
Payer: COMMERCIAL

## 2021-10-11 VITALS
HEIGHT: 65 IN | SYSTOLIC BLOOD PRESSURE: 134 MMHG | HEART RATE: 69 BPM | RESPIRATION RATE: 16 BRPM | WEIGHT: 145 LBS | DIASTOLIC BLOOD PRESSURE: 66 MMHG | BODY MASS INDEX: 24.16 KG/M2

## 2021-10-11 DIAGNOSIS — Z11.59 ENCOUNTER FOR SCREENING FOR OTHER VIRAL DISEASES: ICD-10-CM

## 2021-10-11 DIAGNOSIS — I48.0 PAROXYSMAL ATRIAL FIBRILLATION (H): Primary | ICD-10-CM

## 2021-10-11 DIAGNOSIS — I48.0 PAROXYSMAL ATRIAL FIBRILLATION (H): ICD-10-CM

## 2021-10-11 DIAGNOSIS — Z91.199 FAILURE TO ATTEND APPOINTMENT: Primary | ICD-10-CM

## 2021-10-11 LAB
ANION GAP SERPL CALCULATED.3IONS-SCNC: 6 MMOL/L (ref 5–18)
ATRIAL RATE - MUSE: 69 BPM
BUN SERPL-MCNC: 14 MG/DL (ref 8–28)
CALCIUM SERPL-MCNC: 10.4 MG/DL (ref 8.5–10.5)
CHLORIDE BLD-SCNC: 104 MMOL/L (ref 98–107)
CO2 SERPL-SCNC: 29 MMOL/L (ref 22–31)
CREAT SERPL-MCNC: 0.76 MG/DL (ref 0.6–1.1)
DIASTOLIC BLOOD PRESSURE - MUSE: NORMAL MMHG
ERYTHROCYTE [DISTWIDTH] IN BLOOD BY AUTOMATED COUNT: 15.9 % (ref 10–15)
GFR SERPL CREATININE-BSD FRML MDRD: 73 ML/MIN/1.73M2
GLUCOSE BLD-MCNC: 95 MG/DL (ref 70–125)
HCT VFR BLD AUTO: 34.2 % (ref 35–47)
HGB BLD-MCNC: 10.1 G/DL (ref 11.7–15.7)
INTERPRETATION ECG - MUSE: NORMAL
MCH RBC QN AUTO: 24.9 PG (ref 26.5–33)
MCHC RBC AUTO-ENTMCNC: 29.5 G/DL (ref 31.5–36.5)
MCV RBC AUTO: 84 FL (ref 78–100)
P AXIS - MUSE: 38 DEGREES
PLATELET # BLD AUTO: 333 10E3/UL (ref 150–450)
POTASSIUM BLD-SCNC: 4.4 MMOL/L (ref 3.5–5)
PR INTERVAL - MUSE: 148 MS
QRS DURATION - MUSE: 82 MS
QT - MUSE: 382 MS
QTC - MUSE: 409 MS
R AXIS - MUSE: 3 DEGREES
RBC # BLD AUTO: 4.05 10E6/UL (ref 3.8–5.2)
SODIUM SERPL-SCNC: 139 MMOL/L (ref 136–145)
SYSTOLIC BLOOD PRESSURE - MUSE: NORMAL MMHG
T AXIS - MUSE: 43 DEGREES
VENTRICULAR RATE- MUSE: 69 BPM
WBC # BLD AUTO: 6.6 10E3/UL (ref 4–11)

## 2021-10-11 PROCEDURE — 93000 ELECTROCARDIOGRAM COMPLETE: CPT | Performed by: INTERNAL MEDICINE

## 2021-10-11 PROCEDURE — 85027 COMPLETE CBC AUTOMATED: CPT

## 2021-10-11 PROCEDURE — U0005 INFEC AGEN DETEC AMPLI PROBE: HCPCS

## 2021-10-11 PROCEDURE — U0003 INFECTIOUS AGENT DETECTION BY NUCLEIC ACID (DNA OR RNA); SEVERE ACUTE RESPIRATORY SYNDROME CORONAVIRUS 2 (SARS-COV-2) (CORONAVIRUS DISEASE [COVID-19]), AMPLIFIED PROBE TECHNIQUE, MAKING USE OF HIGH THROUGHPUT TECHNOLOGIES AS DESCRIBED BY CMS-2020-01-R: HCPCS

## 2021-10-11 PROCEDURE — 80048 BASIC METABOLIC PNL TOTAL CA: CPT

## 2021-10-11 PROCEDURE — 36415 COLL VENOUS BLD VENIPUNCTURE: CPT

## 2021-10-11 PROCEDURE — 99207 PR NO CHARGE NURSE ONLY: CPT

## 2021-10-11 RX ORDER — CEFAZOLIN SODIUM 2 G/100ML
2 INJECTION, SOLUTION INTRAVENOUS
Status: CANCELLED | OUTPATIENT
Start: 2021-10-11

## 2021-10-11 RX ORDER — SODIUM CHLORIDE 9 MG/ML
1000 INJECTION, SOLUTION INTRAVENOUS CONTINUOUS
Status: CANCELLED | OUTPATIENT
Start: 2021-10-11

## 2021-10-11 RX ORDER — ASPIRIN 81 MG/1
81 TABLET ORAL ONCE
Status: CANCELLED | OUTPATIENT
Start: 2021-10-11 | End: 2021-10-11

## 2021-10-11 RX ORDER — LIDOCAINE 40 MG/G
CREAM TOPICAL
Status: CANCELLED | OUTPATIENT
Start: 2021-10-11

## 2021-10-11 ASSESSMENT — MIFFLIN-ST. JEOR: SCORE: 1118.6

## 2021-10-11 NOTE — PROGRESS NOTES
Nurse visit only.  Provider could not make visit due to extemporary circumstances  This encounter was opened in error. Please disregard.

## 2021-10-11 NOTE — PROGRESS NOTES
MODIFIED LESLIE SCALE   Timepoint: Pre-LAAC    Previous score: initial LESLIE    Score Description   0 No symptoms at all   1 No significant disability despite symptoms; able to carry out all usual duties and activities   2 Slight disability; unable to carry out all previous activities, but able to look after own affairs without assistance   3 Moderate disability; requiring some help, but able to walk without assistance   4 Moderately severe disability; unable to walk without assistance and unable to attend to own bodily needs without assistance   5 Severe disability; bedridden, incontinent and requiring constant nursing care and attention   6 Dead    Total score (0 - 6):  0    Change in score if s/p LAAC? N/A  If yes, notify implanting cardiologist.    Jacinda Keyes RN BSN  Structural Heart Coordinator   [facility]  [phone]

## 2021-10-11 NOTE — LETTER
10/11/2021    Chai Quintero MD, MD  [facility] [address]  [address] [place] [address]    RE: Estrellita Herring       Dear Colleague,    I had the pleasure of seeing Estrellita Herring in the [facility].    Nurse visit only.  Provider could not make visit due to extemporary circumstances  This encounter was opened in error. Please disregard.      Thank you for allowing me to participate in the care of your patient.      Sincerely,     Jackelyn Bear PA-C     [facility]  cc:   No referring provider defined for this encounter.

## 2021-10-11 NOTE — PROGRESS NOTES
Estrellita Herring  [address]  [facility] [place] [address]  [phone] (home)     Patient in to see RN for Pre-LAAC visit on 10/11 (RN Visit Only)    All pre-procedure labs drawn: 10/11/21   EKG obtained: 10/11/21   Labs reviewed: Pending  Renal Issues: No  Diabetic?: No  Device?: No    Covid-19 Test Date: 10/11/21 [facility] Patient understands after they get their test, they are to self-isolate at home until their procedure. They will only be called back if there results are positive or if there is an issue with not receiving their results back by 4:30pm the day before their scheduled procedure.    Pre-procedure instructions  Patient instructed to be NPO after midnight the evening prior to procedure.  Patient instructed to shower the evening before or the morning of the procedure.  Leave all valuables at home (jewlery, rings, watches, large amounts of money).  Patient understands there is one visitor allowed during patients stay. Visitor will need to wear a mask their entire stay and remain in the restricted area per guidelines.   Patient instructed to arrange for transportation home following procedure from a responsible family member of friend. No driving for at least 24 hours post-procedure.  Patient instructed to have a responsible adult with them for 24 hours post-procedure.  Post-procedure follow up process.    Patient instructed on medications:   Take Eliquis, metoprolol, omeprazole     Aspirin 81mg morning of procedure: To be started morning of procedure in preop    Education was given to patient regarding what to expect pre-procedure.     Patient was informed procedure will be done at [facility]: Main Entrance at [address]; [place] [address] and their arrival time is at 730am    LAAC procedure, VASQUEZ  and blood consent signed at the time of the appt: To be signed morning of procedure    All questions were answered to  family and patient by RN.    Pt was present at the time of appointment.    Jacinda Keyes RN BSN  Structural Heart Coordinator   [facility]  [phone]    Patient Active Problem List   Diagnosis     Essential hypertension     Gastroesophageal reflux disease, esophagitis presence not specified     Ankylosis of spine     Paroxysmal atrial fibrillation (H)     Status post catheter ablation of atrial fibrillation     Ankylosing spondylitis (H)     Degenerative arthritis of knee     Anemia     Diverticulitis of colon     Current Outpatient Medications   Medication Sig     ELIQUIS 5 mg Tab tablet [ELIQUIS 5 MG TAB TABLET] TAKE 1 TABLET BY MOUTH EVERY 12 HOURS     inFLIXimab (REMICADE) 100 mg injection [INFLIXIMAB (REMICADE) 100 MG INJECTION] Infuse 400 mg into a venous catheter every 2 (two) months.     metoprolol succinate (TOPROL-XL) 25 MG [METOPROLOL SUCCINATE (TOPROL-XL) 25 MG] Take 1 tablet (25 mg total) by mouth daily.     Multiple Vitamin (MULTIVITAMIN ADULT PO) Take 1 tablet by mouth daily     omeprazole (PRILOSEC) 20 MG capsule [OMEPRAZOLE (PRILOSEC) 20 MG CAPSULE] TAKE 1 CAPSULE BY MOUTH ONCE DAILY OCCASIONALLY TWICE DAILY     prednisoLONE acetate (PREDNISOLONE ACETATE P-F) 1 % ophthalmic suspension Place 1 drop Into the left eye 4 times daily as needed      vit C/E/Zn/coppr/lutein/zeaxan (PRESERVISION AREDS-2 ORAL) [VIT C/E/ZN/COPPR/LUTEIN/ZEAXAN (PRESERVISION AREDS-2 ORAL)] Take 1 tablet by mouth 2 (two) times a day.     No current facility-administered medications for this visit.      Allergies   Allergen Reactions     Sulfa (Sulfonamide Antibiotics) [Sulfa Drugs] Rash     hallucinations

## 2021-10-12 ENCOUNTER — ANESTHESIA EVENT (OUTPATIENT)
Dept: CARDIOLOGY | Facility: CLINIC | Age: 81
DRG: 274 | End: 2021-10-12
Payer: COMMERCIAL

## 2021-10-12 LAB — SARS-COV-2 RNA RESP QL NAA+PROBE: NEGATIVE

## 2021-10-13 ENCOUNTER — HOSPITAL ENCOUNTER (OUTPATIENT)
Dept: CARDIOLOGY | Facility: CLINIC | Age: 81
DRG: 274 | End: 2021-10-13
Attending: INTERNAL MEDICINE | Admitting: INTERNAL MEDICINE
Payer: COMMERCIAL

## 2021-10-13 ENCOUNTER — HOSPITAL ENCOUNTER (INPATIENT)
Facility: CLINIC | Age: 81
LOS: 1 days | Discharge: HOME | DRG: 274 | End: 2021-10-13
Attending: INTERNAL MEDICINE | Admitting: INTERNAL MEDICINE
Payer: COMMERCIAL

## 2021-10-13 ENCOUNTER — ANESTHESIA (OUTPATIENT)
Dept: CARDIOLOGY | Facility: CLINIC | Age: 81
DRG: 274 | End: 2021-10-13
Payer: COMMERCIAL

## 2021-10-13 VITALS
RESPIRATION RATE: 16 BRPM | HEIGHT: 64 IN | SYSTOLIC BLOOD PRESSURE: 149 MMHG | WEIGHT: 144.4 LBS | DIASTOLIC BLOOD PRESSURE: 65 MMHG | HEART RATE: 81 BPM | TEMPERATURE: 98 F | OXYGEN SATURATION: 94 % | BODY MASS INDEX: 24.65 KG/M2

## 2021-10-13 DIAGNOSIS — I48.0 PAROXYSMAL ATRIAL FIBRILLATION (H): ICD-10-CM

## 2021-10-13 DIAGNOSIS — Z95.818 PRESENCE OF LEFT ATRIAL APPENDAGE CLOSURE DEVICE COMPOSED OF NICKEL-TITANIUM ALLOY WITH POLYETHYLENE TEREPHTHALATE MEMBRANE: Primary | ICD-10-CM

## 2021-10-13 LAB
ABO/RH(D): NORMAL
ACT BLD: 267 SECONDS (ref 74–150)
ACT BLD: 271 SECONDS (ref 74–150)
ACT BLD: 271 SECONDS (ref 74–150)
ANTIBODY SCREEN: NEGATIVE
BLD PROD TYP BPU: NORMAL
BLD PROD TYP BPU: NORMAL
BLOOD COMPONENT TYPE: NORMAL
BLOOD COMPONENT TYPE: NORMAL
CODING SYSTEM: NORMAL
CODING SYSTEM: NORMAL
CREAT SERPL-MCNC: 0.7 MG/DL (ref 0.52–1.04)
CROSSMATCH: NORMAL
CROSSMATCH: NORMAL
GFR SERPL CREATININE-BSD FRML MDRD: 81 ML/MIN/1.73M2
GLUCOSE BLDC GLUCOMTR-MCNC: 84 MG/DL (ref 70–99)
HGB BLD-MCNC: 9 G/DL (ref 11.7–15.7)
LVEF ECHO: NORMAL
SPECIMEN EXPIRATION DATE: NORMAL
UNIT ABO/RH: NORMAL
UNIT ABO/RH: NORMAL
UNIT NUMBER: NORMAL
UNIT NUMBER: NORMAL
UNIT STATUS: NORMAL
UNIT STATUS: NORMAL
UNIT TYPE ISBT: 6200
UNIT TYPE ISBT: 6200

## 2021-10-13 PROCEDURE — 93355 ECHO TRANSESOPHAGEAL (TEE): CPT | Performed by: GENERAL ACUTE CARE HOSPITAL

## 2021-10-13 PROCEDURE — 258N000003 HC RX IP 258 OP 636: Performed by: NURSE ANESTHETIST, CERTIFIED REGISTERED

## 2021-10-13 PROCEDURE — 999N000134 HC STATISTIC PP CARE STAGE 3: Performed by: ANESTHESIOLOGY

## 2021-10-13 PROCEDURE — 02L73DK OCCLUSION OF LEFT ATRIAL APPENDAGE WITH INTRALUMINAL DEVICE, PERCUTANEOUS APPROACH: ICD-10-PCS | Performed by: INTERNAL MEDICINE

## 2021-10-13 PROCEDURE — 36415 COLL VENOUS BLD VENIPUNCTURE: CPT | Performed by: INTERNAL MEDICINE

## 2021-10-13 PROCEDURE — 250N000009 HC RX 250: Performed by: NURSE ANESTHETIST, CERTIFIED REGISTERED

## 2021-10-13 PROCEDURE — 33340 PERQ CLSR TCAT L ATR APNDGE: CPT | Mod: Q0 | Performed by: INTERNAL MEDICINE

## 2021-10-13 PROCEDURE — 272N000001 HC OR GENERAL SUPPLY STERILE: Performed by: INTERNAL MEDICINE

## 2021-10-13 PROCEDURE — 370N000017 HC ANESTHESIA TECHNICAL FEE, PER MIN: Performed by: INTERNAL MEDICINE

## 2021-10-13 PROCEDURE — C1760 CLOSURE DEV, VASC: HCPCS | Performed by: INTERNAL MEDICINE

## 2021-10-13 PROCEDURE — 85018 HEMOGLOBIN: CPT | Performed by: INTERNAL MEDICINE

## 2021-10-13 PROCEDURE — 82962 GLUCOSE BLOOD TEST: CPT

## 2021-10-13 PROCEDURE — 250N000011 HC RX IP 250 OP 636: Performed by: INTERNAL MEDICINE

## 2021-10-13 PROCEDURE — 250N000013 HC RX MED GY IP 250 OP 250 PS 637: Performed by: INTERNAL MEDICINE

## 2021-10-13 PROCEDURE — 214N000001 HC R&B CCU UMMC

## 2021-10-13 PROCEDURE — 33340 PERQ CLSR TCAT L ATR APNDGE: CPT | Performed by: INTERNAL MEDICINE

## 2021-10-13 PROCEDURE — 86923 COMPATIBILITY TEST ELECTRIC: CPT | Performed by: INTERNAL MEDICINE

## 2021-10-13 PROCEDURE — 86901 BLOOD TYPING SEROLOGIC RH(D): CPT | Performed by: INTERNAL MEDICINE

## 2021-10-13 PROCEDURE — 93325 DOPPLER ECHO COLOR FLOW MAPG: CPT

## 2021-10-13 PROCEDURE — 85347 COAGULATION TIME ACTIVATED: CPT

## 2021-10-13 PROCEDURE — 258N000003 HC RX IP 258 OP 636: Performed by: INTERNAL MEDICINE

## 2021-10-13 PROCEDURE — C1769 GUIDE WIRE: HCPCS | Performed by: INTERNAL MEDICINE

## 2021-10-13 PROCEDURE — 82565 ASSAY OF CREATININE: CPT | Performed by: INTERNAL MEDICINE

## 2021-10-13 PROCEDURE — C1894 INTRO/SHEATH, NON-LASER: HCPCS | Performed by: INTERNAL MEDICINE

## 2021-10-13 PROCEDURE — 250N000011 HC RX IP 250 OP 636: Performed by: NURSE ANESTHETIST, CERTIFIED REGISTERED

## 2021-10-13 DEVICE — OCCLUDER CV WATCHMAN FLX OD24 MM M635WU50240: Type: IMPLANTABLE DEVICE | Site: HEART | Status: FUNCTIONAL

## 2021-10-13 RX ORDER — OXYCODONE HYDROCHLORIDE 5 MG/1
5 TABLET ORAL EVERY 4 HOURS PRN
Status: DISCONTINUED | OUTPATIENT
Start: 2021-10-13 | End: 2021-10-13 | Stop reason: HOSPADM

## 2021-10-13 RX ORDER — ASPIRIN 81 MG/1
81 TABLET ORAL ONCE
Status: COMPLETED | OUTPATIENT
Start: 2021-10-13 | End: 2021-10-13

## 2021-10-13 RX ORDER — HEPARIN SODIUM 1000 [USP'U]/ML
INJECTION, SOLUTION INTRAVENOUS; SUBCUTANEOUS PRN
Status: DISCONTINUED | OUTPATIENT
Start: 2021-10-13 | End: 2021-10-13

## 2021-10-13 RX ORDER — PROPOFOL 10 MG/ML
INJECTION, EMULSION INTRAVENOUS PRN
Status: DISCONTINUED | OUTPATIENT
Start: 2021-10-13 | End: 2021-10-13

## 2021-10-13 RX ORDER — SODIUM CHLORIDE 9 MG/ML
1000 INJECTION, SOLUTION INTRAVENOUS CONTINUOUS
Status: DISCONTINUED | OUTPATIENT
Start: 2021-10-13 | End: 2021-10-13 | Stop reason: HOSPADM

## 2021-10-13 RX ORDER — ONDANSETRON 2 MG/ML
4 INJECTION INTRAMUSCULAR; INTRAVENOUS EVERY 6 HOURS PRN
Status: DISCONTINUED | OUTPATIENT
Start: 2021-10-13 | End: 2021-10-13 | Stop reason: HOSPADM

## 2021-10-13 RX ORDER — SODIUM CHLORIDE 9 MG/ML
INJECTION, SOLUTION INTRAVENOUS CONTINUOUS
Status: ACTIVE | OUTPATIENT
Start: 2021-10-13 | End: 2021-10-13

## 2021-10-13 RX ORDER — LIDOCAINE HYDROCHLORIDE 20 MG/ML
INJECTION, SOLUTION INFILTRATION; PERINEURAL PRN
Status: DISCONTINUED | OUTPATIENT
Start: 2021-10-13 | End: 2021-10-13

## 2021-10-13 RX ORDER — LIDOCAINE 40 MG/G
CREAM TOPICAL
Status: DISCONTINUED | OUTPATIENT
Start: 2021-10-13 | End: 2021-10-13 | Stop reason: HOSPADM

## 2021-10-13 RX ORDER — EPHEDRINE SULFATE 50 MG/ML
INJECTION, SOLUTION INTRAMUSCULAR; INTRAVENOUS; SUBCUTANEOUS PRN
Status: DISCONTINUED | OUTPATIENT
Start: 2021-10-13 | End: 2021-10-13

## 2021-10-13 RX ORDER — ASPIRIN 81 MG/1
81 TABLET ORAL DAILY
Status: DISCONTINUED | OUTPATIENT
Start: 2021-10-14 | End: 2021-10-13 | Stop reason: HOSPADM

## 2021-10-13 RX ORDER — ONDANSETRON 2 MG/ML
INJECTION INTRAMUSCULAR; INTRAVENOUS PRN
Status: DISCONTINUED | OUTPATIENT
Start: 2021-10-13 | End: 2021-10-13

## 2021-10-13 RX ORDER — DEXAMETHASONE SODIUM PHOSPHATE 4 MG/ML
INJECTION, SOLUTION INTRA-ARTICULAR; INTRALESIONAL; INTRAMUSCULAR; INTRAVENOUS; SOFT TISSUE PRN
Status: DISCONTINUED | OUTPATIENT
Start: 2021-10-13 | End: 2021-10-13

## 2021-10-13 RX ORDER — SODIUM CHLORIDE, SODIUM LACTATE, POTASSIUM CHLORIDE, CALCIUM CHLORIDE 600; 310; 30; 20 MG/100ML; MG/100ML; MG/100ML; MG/100ML
INJECTION, SOLUTION INTRAVENOUS CONTINUOUS PRN
Status: DISCONTINUED | OUTPATIENT
Start: 2021-10-13 | End: 2021-10-13

## 2021-10-13 RX ORDER — ACETAMINOPHEN 325 MG/1
650 TABLET ORAL EVERY 4 HOURS PRN
Status: DISCONTINUED | OUTPATIENT
Start: 2021-10-13 | End: 2021-10-13 | Stop reason: HOSPADM

## 2021-10-13 RX ORDER — IOPAMIDOL 755 MG/ML
INJECTION, SOLUTION INTRAVASCULAR
Status: DISCONTINUED | OUTPATIENT
Start: 2021-10-13 | End: 2021-10-13 | Stop reason: HOSPADM

## 2021-10-13 RX ORDER — CEFAZOLIN SODIUM 2 G/100ML
2 INJECTION, SOLUTION INTRAVENOUS
Status: COMPLETED | OUTPATIENT
Start: 2021-10-13 | End: 2021-10-13

## 2021-10-13 RX ORDER — OXYCODONE HYDROCHLORIDE 10 MG/1
10 TABLET ORAL EVERY 4 HOURS PRN
Status: DISCONTINUED | OUTPATIENT
Start: 2021-10-13 | End: 2021-10-13 | Stop reason: HOSPADM

## 2021-10-13 RX ORDER — FENTANYL CITRATE 50 UG/ML
INJECTION, SOLUTION INTRAMUSCULAR; INTRAVENOUS PRN
Status: DISCONTINUED | OUTPATIENT
Start: 2021-10-13 | End: 2021-10-13

## 2021-10-13 RX ORDER — ONDANSETRON 4 MG/1
4 TABLET, ORALLY DISINTEGRATING ORAL EVERY 6 HOURS PRN
Status: DISCONTINUED | OUTPATIENT
Start: 2021-10-13 | End: 2021-10-13 | Stop reason: HOSPADM

## 2021-10-13 RX ADMIN — DEXAMETHASONE SODIUM PHOSPHATE 4 MG: 4 INJECTION, SOLUTION INTRA-ARTICULAR; INTRALESIONAL; INTRAMUSCULAR; INTRAVENOUS; SOFT TISSUE at 11:11

## 2021-10-13 RX ADMIN — FENTANYL CITRATE 50 MCG: 50 INJECTION, SOLUTION INTRAMUSCULAR; INTRAVENOUS at 11:02

## 2021-10-13 RX ADMIN — LIDOCAINE HYDROCHLORIDE 40 MG: 20 INJECTION, SOLUTION INFILTRATION; PERINEURAL at 10:50

## 2021-10-13 RX ADMIN — SODIUM CHLORIDE: 9 INJECTION, SOLUTION INTRAVENOUS at 13:25

## 2021-10-13 RX ADMIN — CEFAZOLIN 2 G: 10 INJECTION, POWDER, FOR SOLUTION INTRAVENOUS at 10:47

## 2021-10-13 RX ADMIN — PROPOFOL 50 MG: 10 INJECTION, EMULSION INTRAVENOUS at 10:50

## 2021-10-13 RX ADMIN — PROPOFOL 20 MG: 10 INJECTION, EMULSION INTRAVENOUS at 10:53

## 2021-10-13 RX ADMIN — ONDANSETRON 4 MG: 2 INJECTION INTRAMUSCULAR; INTRAVENOUS at 12:53

## 2021-10-13 RX ADMIN — ASPIRIN 81 MG CHEWABLE TABLET 81 MG: 81 TABLET CHEWABLE at 08:19

## 2021-10-13 RX ADMIN — SODIUM CHLORIDE, POTASSIUM CHLORIDE, SODIUM LACTATE AND CALCIUM CHLORIDE: 600; 310; 30; 20 INJECTION, SOLUTION INTRAVENOUS at 12:00

## 2021-10-13 RX ADMIN — Medication 2000 UNITS: at 11:26

## 2021-10-13 RX ADMIN — SODIUM CHLORIDE, POTASSIUM CHLORIDE, SODIUM LACTATE AND CALCIUM CHLORIDE: 600; 310; 30; 20 INJECTION, SOLUTION INTRAVENOUS at 10:40

## 2021-10-13 RX ADMIN — SUGAMMADEX 200 MG: 100 INJECTION, SOLUTION INTRAVENOUS at 11:57

## 2021-10-13 RX ADMIN — ROCURONIUM BROMIDE 10 MG: 50 INJECTION, SOLUTION INTRAVENOUS at 11:26

## 2021-10-13 RX ADMIN — Medication 5 MG: at 11:09

## 2021-10-13 RX ADMIN — Medication 1000 UNITS: at 11:49

## 2021-10-13 RX ADMIN — Medication 1000 UNITS: at 11:37

## 2021-10-13 RX ADMIN — ROCURONIUM BROMIDE 40 MG: 50 INJECTION, SOLUTION INTRAVENOUS at 10:50

## 2021-10-13 RX ADMIN — PROPOFOL 30 MG: 10 INJECTION, EMULSION INTRAVENOUS at 10:55

## 2021-10-13 RX ADMIN — Medication 6000 UNITS: at 11:12

## 2021-10-13 RX ADMIN — FENTANYL CITRATE 50 MCG: 50 INJECTION, SOLUTION INTRAMUSCULAR; INTRAVENOUS at 11:53

## 2021-10-13 RX ADMIN — ONDANSETRON 4 MG: 2 INJECTION INTRAMUSCULAR; INTRAVENOUS at 11:11

## 2021-10-13 ASSESSMENT — ENCOUNTER SYMPTOMS: DYSRHYTHMIAS: 1

## 2021-10-13 ASSESSMENT — ACTIVITIES OF DAILY LIVING (ADL): ADLS_ACUITY_SCORE: 12

## 2021-10-13 ASSESSMENT — MIFFLIN-ST. JEOR: SCORE: 1100

## 2021-10-13 NOTE — INTERVAL H&P NOTE
I have reviewed the surgical (or preoperative) H&P that is linked to this encounter, and examined the patient. There are no significant changes    Consents were signed this morning and witnessed by me.  Patients understands procedure, risks/benefits.

## 2021-10-13 NOTE — ANESTHESIA PROCEDURE NOTES
Airway       Patient location during procedure: OR       Procedure Start/Stop Times: 10/13/2021 10:55 AM  Staff -        Performed By: CRNA  Consent for Airway        Urgency: elective  Indications and Patient Condition       Indications for airway management: lissa-procedural       Induction type:intravenous       Mask difficulty assessment: 1 - vent by mask    Final Airway Details       Final airway type: endotracheal airway       Successful airway: ETT - single and Oral  Endotracheal Airway Details        ETT size (mm): 7.0       Cuffed: yes       Successful intubation technique: direct laryngoscopy       DL Blade Type: Garvey 2       Grade View of Cords: 1 (open and clear )       Adjucts: stylet       Position: Right       Measured from: gums/teeth       Secured at (cm): 21       Bite block used: None    Post intubation assessment        Placement verified by: capnometry, equal breath sounds and chest rise        Number of attempts at approach: 1       Secured with: cloth tape       Ease of procedure: easy       Dentition: Intact and Unchanged

## 2021-10-13 NOTE — DISCHARGE INSTRUCTIONS
Going home after Left Atrial Appendage Occlusion Device Implant    Once Home:    You should arrange for someone to stay with you for the first 24 hours after discharge    Make sure you are taking all of your medications as directed in your discharge summary.  Do not stop taking these medications (especially your blood thinner and baby aspirin) without speaking to your provider.     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    Do not scrub the site.    For the first 2 days: Do not stoop or squat. When you cough, sneeze or move your bowels, hold your hand over the puncture site and press gently.    Do not use lotion or powder near the puncture site for 3 days.    Ok to use ice packs at groin sites 20 minutes at a time for groin discomfort    If you start bleeding from the site in your groin, lie down flat and press firmly on the site. Call your doctor as soon as you can.    Call your doctor if:    You have a large or growing hard lump around the site.    The site is red, swollen, hot or tender.    Blood or fluid is draining from the site.    You have chills or a fever greater than 101 F (38 C).    Your leg or arm feels numb or cool.    You have hives, a rash or unusual itching.    To reduce the risk of infection, avoid dental procedures (including cleanings) for the first 6 weeks.  Contact your cardiology clinic for an antibiotic should you need to see the dentist in the first 6 weeks post left atrial appendage implant.    Dial 911 if you have bleeding that is heavy or does not stop OR for any NEW signs/symptoms of a  stroke:    Visual disturbance    Problems with talking    Smile only occurs on half your face    Numbness on one side of your face or body    Sudden headache    Confusion    Problems with walking or balance    Follow up appointments:     Post-Op Appointment Date: October 15 at 8:10 am with RN Coordinator     At [address], [address]    [facility] [address]    6 Week Post Implant Visit Date: November 23 at 12:50 pm with Jackelyn Bear PA-C    At [address], [address] [address]    COVID-19 Test for 6 Week Follow up Transesophageal Echocardiogram (VASQUEZ) Date: November 23 at 2 pm       6 Week Transesophageal Echocardiogram: Please arrange for a responsible adult to drive you to and from this appointment. There will be nothing to eat or drink for at least 6 hours prior to your arrival time for the VASQUEZ.    Date: November 26 at noon -  Location: [facility]      Your Procedural Physician was: Dr. Velázquez     To reach the [facility] nurse coordinator (Jacinda) please call [phone]        If you have issues tonight when you get home, please page [phone] and enter your phone number.  Jackelyn, xander CHUNG will call you back.        If after 9pm, then call the [facility] at [phone] and you will be connected to the on call doctor                                                                  If you are calling after hours, please listen to the entire voicemail, a live  will answer at the end of the message

## 2021-10-13 NOTE — PROGRESS NOTES
Patient developed hematoma after getting up to use restroom at completion of bedrest. Pressure was applied for 10 min with resolution of hematoma. GUADALUPE paged and per Jackelyn, additional bedrest until 1715. Ok to discharge at that time as long as site is soft.

## 2021-10-13 NOTE — ANESTHESIA PREPROCEDURE EVALUATION
Anesthesia Pre-Procedure Evaluation    Patient: Estrellita Herring   MRN: [id number] : 1940        Preoperative Diagnosis: other    Procedure : Procedure(s):  CV LEFT ATRIAL APPENDAGE CLOSURE          Past Medical History:   Diagnosis Date     Ankylosis of spine      Atrial fibrillation (H)      Diverticulitis      Hypertension      Iritis       Past Surgical History:   Procedure Laterality Date     CATARACT EXTRACTION, BILATERAL       COLECTOMY  2006    partial     EP ABLATION  10/1/2019          EP ABLATION PVI Left 10/1/2019    Procedure: EP Ablation PVI;  Surgeon: Gudelia Strong MD;  Location: [facility];  Service: Cardiology     TOTAL KNEE ARTHROPLASTY Left       Allergies   Allergen Reactions     Sulfa (Sulfonamide Antibiotics) [Sulfa Drugs] Rash     hallucinations      Social History     Tobacco Use     Smoking status: Never Smoker     Smokeless tobacco: Never Used   Substance Use Topics     Alcohol use: No      Wt Readings from Last 1 Encounters:   10/13/21 65.5 kg (144 lb 6.4 oz)        Anesthesia Evaluation   Pt has had prior anesthetic. Type: General.    No history of anesthetic complications       ROS/MED HX  ENT/Pulmonary:       Neurologic:       Cardiovascular:     (+) hypertension-range: 130/80/ ----dysrhythmias, a-fib,     METS/Exercise Tolerance: 4 - Raking leaves, gardening    Hematologic:       Musculoskeletal:   (+) arthritis,     GI/Hepatic:       Renal/Genitourinary:       Endo:       Psychiatric/Substance Use:       Infectious Disease:       Malignancy:       Other:            Physical Exam    Airway        Mallampati: II   TM distance: > 3 FB   Neck ROM: full   Mouth opening: > 3 cm    Respiratory Devices and Support         Dental           Cardiovascular          Rhythm and rate: regular and normal     Pulmonary           breath sounds clear to auscultation           OUTSIDE LABS:  CBC:   Lab Results   Component Value Date    WBC 6.6 10/11/2021    WBC 7.0 2021    HGB  10.1 (L) 10/11/2021    HGB 10.4 (L) 08/05/2021    HCT 34.2 (L) 10/11/2021    HCT 34.7 (L) 08/05/2021     10/11/2021     08/05/2021     BMP:   Lab Results   Component Value Date     10/11/2021     08/05/2021    POTASSIUM 4.4 10/11/2021    POTASSIUM 4.5 08/05/2021    CHLORIDE 104 10/11/2021    CHLORIDE 104 08/05/2021    CO2 29 10/11/2021    CO2 27 08/05/2021    BUN 14 10/11/2021    BUN 13 08/05/2021    CR 0.76 10/11/2021    CR 0.76 08/05/2021    GLC 84 10/13/2021    GLC 95 10/11/2021     COAGS: No results found for: PTT, INR, FIBR  POC: No results found for: BGM, HCG, HCGS  HEPATIC:   Lab Results   Component Value Date    ALBUMIN 3.6 10/22/2018    PROTTOTAL 7.1 10/22/2018    ALT 9 10/22/2018    AST 17 10/22/2018    ALKPHOS 86 10/22/2018    BILITOTAL 0.3 10/22/2018     OTHER:   Lab Results   Component Value Date    PH 7.45 (H) 10/01/2019    RICK 10.4 10/11/2021    TSH 2.30 10/22/2018    SED 43 (H) 10/22/2018       Anesthesia Plan    ASA Status:  3   NPO Status:  NPO Appropriate    Anesthesia Type: General.     - Airway: ETT   Induction: Intravenous.   Maintenance: Balanced.   Techniques and Equipment:     - Airway: Video-Laryngoscope     - Lines/Monitors: 2nd IV     - Blood: T&S     Consents    Anesthesia Plan(s) and associated risks, benefits, and realistic alternatives discussed. Questions answered and patient/representative(s) expressed understanding.     - Discussed with:  Patient      - Extended Intubation/Ventilatory Support Discussed: No.      - Patient is DNR/DNI Status: No    Use of blood products discussed: Yes.     - Discussed with: Patient.     - Consented: consented to blood products            Reason for refusal: other.     Postoperative Care    Pain management: IV analgesics.   PONV prophylaxis: Ondansetron (or other 5HT-3), Dexamethasone or Solumedrol     Comments:                Ottoniel Hair MD

## 2021-10-13 NOTE — DISCHARGE SUMMARY
HEART CARE INPATIENT ENCOUNTER NOTE      Structural Discharge Summary    Primary Care Physician:  Chai Quintero    Discharge Provider: Jackelyn Bear PA-C     Admission Date: 10/13/2021. Admission Diagnoses: Paroxysmal atrial fibrillation (H) [I48.0]   Discharge Date: October 13, 2021   Disposition: Home   Condition at Discharge: Stable  Code Status: Prior     Principal Diagnosis:  <principal problem not specified>    Discharge Diagnoses:  Active Problems:    Paroxysmal atrial fibrillation (H)    s/p left atrial appendage closure      Consult/s: none  Significant Diagnostic Studies:   Interpretation Summary  Structural VASQUEZ for Left Atrial Occlusion Device     Pre-Device:  1. Normal left ventricular size and systolic function. LVEF:55%  2. No left atrial thrombus or spontaneous contrast. Mild left atrial  enlargement.  3. No ASD or PFO by color flow.  4. No pericardial effusion.     Post Device:  1. Well seated 24 m Watchman FLX Device in left atrial appendage by 2D and 3D  imaging. No color doppler evidence of flow around device at ostium insertion  before or after device release. No change in mitral valve function. No  thrombus noted on device, LA or FRANCISCO.     FRANCISCO device measurements:  Device compression diameter:  Post deployment  0Â : 21.5 mm  45Â : 21 mm  90Â : 21.6 mm  135 Â : 22 mm     2. Normal left ventricular size and systolic function. LVEF:55%  3. Small ASD with unidirectional flow (left to right) by color flow doppler.  4. No post procedural pericardial effusion.    Treatments: procedures: LAAO implant (Watchman FLX)    Discharge Medications:   Discharge Medication List as of 10/13/2021  2:38 PM        CONTINUE these medications which have NOT CHANGED    Details   ELIQUIS 5 mg Tab tablet [ELIQUIS 5 MG TAB TABLET] TAKE 1 TABLET BY MOUTH EVERY 12 HOURS, Disp-180 tablet, R-1, Normal      inFLIXimab (REMICADE) 100 mg injection [INFLIXIMAB (REMICADE) 100 MG INJECTION] Infuse 400 mg into a venous  catheter every 2 (two) months., Historical      metoprolol succinate (TOPROL-XL) 25 MG [METOPROLOL SUCCINATE (TOPROL-XL) 25 MG] Take 1 tablet (25 mg total) by mouth daily., Disp-90 tablet, R-3, Normal      !! Multiple Vitamin (MULTIVITAMIN ADULT PO) Take 1 tablet by mouth daily, Historical      omeprazole (PRILOSEC) 20 MG capsule [OMEPRAZOLE (PRILOSEC) 20 MG CAPSULE] TAKE 1 CAPSULE BY MOUTH ONCE DAILY OCCASIONALLY TWICE DAILY, Historical      prednisoLONE acetate (PREDNISOLONE ACETATE P-F) 1 % ophthalmic suspension Place 1 drop Into the left eye 4 times daily as needed , Historical      !! vit C/E/Zn/coppr/lutein/zeaxan (PRESERVISION AREDS-2 ORAL) [VIT C/E/ZN/COPPR/LUTEIN/ZEAXAN (PRESERVISION AREDS-2 ORAL)] Take 1 tablet by mouth 2 (two) times a day., Historical       !! - Potential duplicate medications found. Please discuss with provider.          Discharge Instructions:  Follow up appointment with JAZZY RN: Friday, October 15  Follow up appointment with Isi in 45 days   Follow up Echocardiogram: in 45 days    Diet: Regular diet. Increase fluids over the next 2 days.   Activity: Activity as tolerated   Restrictions: No lifting >10 lbs or vigorous exercise for 5 days. No driving for 3 days. May return to work in 5 days  Wound / drain care: OK to shower next day. Keep wound clean and dry. Ok to use Ice packs PRN for discomfort. No baths, hot tubs or swimming pools for 5 days.    Hospital Summary:   Ms. Estrellita Herring is a 81 year old female who underwent successful LAAO implant with 24 mm Watchman FLX device. The patient was recovered in PACU and then transferred to  where they remained on bedrest for the remainder of 4 hours.  The patient dangled at the edge of bed and ambulated.  After ambulation, there was evidence of small, hard hematoma.  The nursing staff held about 10 minutes of pressure to soften the hematoma and then called me to report their findings.  I instructed to keep her on bedrest for  "another 45 minutes, walk her again and make sure hematoma remained soft and stable prior to discharge.   She had voided without difficulty.  After the extra bedrest, vascular access site remained stable.  Post procedure the patient denies chest pain/pressure, palpitations, or shortness of breath.      Repeat labs were reviewed.  Activity restrictions and reportable signs and symptoms were discussed with the patient who verbalizes understanding. She will continue on Eliquis for CVA prevention at discharge. Tomorrow morning she will start taking ASA 81 mg daily along with her Eliquis.     Follow up is arranged as above.        Physical Examination   BP (!) 149/65   Pulse 81   Temp 98  F (36.7  C) (Oral)   Resp 16   Ht 1.626 m (5' 4\")   Wt 65.5 kg (144 lb 6.4 oz)   SpO2 94%   Breastfeeding No   BMI 24.79 kg/m    Body mass index is 24.79 kg/m .  Wt Readings from Last 3 Encounters:   10/13/21 65.5 kg (144 lb 6.4 oz)   10/11/21 65.8 kg (145 lb)   09/27/21 66.6 kg (146 lb 12.8 oz)       Intake/Output Summary (Last 24 hours) at 10/13/2021 1503  Last data filed at 10/13/2021 1200  Gross per 24 hour   Intake 1000 ml   Output 15 ml   Net 985 ml     General Appearance:   no distress, normal body habitus   Chest/Lungs:   Normal respiratory effort. Respirations are even and unlabored. Lungs are clear to auscultation, no rales or wheezing. No chest wall tenderness.    Cardiovascular:   Regular. Normal S1, S2 with no murmurs, rubs, or gallops; the carotid, radial, dorsalis pedis and posterior tibial pulses are intact   Abdomen:  Soft, non-tender to palpation, non-distended. + bowel sounds.   Extremities: No edema    Skin: Right groin site WNL; Stitch in place   Neurologic: Alert and oriented x3, calm and able to move all 4 extremities appropriately            Lab Results    Chemistry/lipid CBC Cardiac Enzymes/BNP/TSH/INR   Creat 0.70 - 10/13/2021 Hgb 9 - 10/13/2021 Lab Results   Component Value Date    TSH 2.30 10/22/2018 "

## 2021-10-13 NOTE — ANESTHESIA POSTPROCEDURE EVALUATION
Patient: Estrellita Herring    Procedure: Procedure(s):  CV LEFT ATRIAL APPENDAGE CLOSURE       Diagnosis:other  Diagnosis Additional Information: No value filed.    Anesthesia Type:  General    Note:  Disposition: Outpatient   Postop Pain Control: Uneventful            Sign Out: Well controlled pain   PONV: No   Neuro/Psych: Uneventful            Sign Out: Acceptable/Baseline neuro status   Airway/Respiratory: Uneventful            Sign Out: Acceptable/Baseline resp. status   CV/Hemodynamics: Uneventful            Sign Out: Acceptable CV status; No obvious hypovolemia; No obvious fluid overload   Other NRE: NONE   DID A NON-ROUTINE EVENT OCCUR? No           Last vitals:  Vitals Value Taken Time   /79 10/13/21 1315   Temp 36.3  C (97.3  F) 10/13/21 1315   Pulse 70 10/13/21 1327   Resp 20 10/13/21 1315   SpO2 98 % 10/13/21 1327   Vitals shown include unvalidated device data.    Electronically Signed By: Ned Torres III, MD  October 13, 2021  1:29 PM

## 2021-10-13 NOTE — PROGRESS NOTES
D/I/A: Pt roomed on 3C in bay [address].  Arrived via litter and accompanied by RN On/Off: On monitor.  Nueros intact/WDL/baseline; A+O x4; [place] with [employer] hearing aides in; VSSA.  Rhythm upon arrival SR on monitor.  Denies pain or sob.  Reviewed activity restrictions and when to notify RN, ie-changes to breathing or increased chest pressure or chest pain.  CCL access:  RFV 16 FR site, sheath out; dressing C/D/I; CMS intact.  P: Continue to monitor status.  Discharge to home once meeting criteria.

## 2021-10-13 NOTE — Clinical Note
0 : 15.5. 45 : 16.8. 90 : 19.5. 135 : 17. 0 : 20. 45 : 20.9. 90 : 22. 135 : 12.4. 0 : 21.5 . 45 : 21 . 90 : 21.6 . 135 : 22 . FRANCISCO type used: BroccoliPosition: Passed. [place]: Passed. Size: Accepted. Seal: Complete. Jet: 0.

## 2021-10-13 NOTE — ANESTHESIA CARE TRANSFER NOTE
Patient: Estrellita Herring    Procedure: Procedure(s):  CV LEFT ATRIAL APPENDAGE CLOSURE       Diagnosis: other  Diagnosis Additional Information: No value filed.    Anesthesia Type:   General     Note:    Oropharynx: oropharynx clear of all foreign objects and spontaneously breathing  Level of Consciousness: awake  Oxygen Supplementation: nasal cannula  Level of Supplemental Oxygen (L/min / FiO2): 4  Independent Airway: airway patency satisfactory and stable  Dentition: dentition unchanged  Vital Signs Stable: post-procedure vital signs reviewed and stable  Report to RN Given: handoff report given  Patient transferred to: PACU  Comments: Pt awake, and interacting with staff,  Encouragement to cough  Report to RN   Handoff Report: Identifed the Patient, Identified the Reponsible Provider, Reviewed the pertinent medical history, Discussed the surgical course, Reviewed Intra-OP anesthesia mangement and issues during anesthesia, Set expectations for post-procedure period and Allowed opportunity for questions and acknowledgement of understanding      Vitals:  Vitals Value Taken Time   BP     Temp     Pulse     Resp     SpO2 89 % 10/13/21 1219   Vitals shown include unvalidated device data.    Electronically Signed By: LATISHA Sorenson CRNA  October 13, 2021  12:20 PM

## 2021-10-14 NOTE — PROGRESS NOTES
D/I/A:  Patient is tolerating liquids and foods, ambulating, urinating, puncture sites are stable ( no bleeding and no hematoma) and patient has a , son.  A+O x4 and making needs known.  CCL access sites C/D/I; no bleeding or hematoma; CMS intact.  VSSA.  SR on monitor.  IV access removed.  Education completed and outlined in AVS or handout: medications reviewed with patient.  Questions answered prior to discharge.  Belongings returned to patient at discharge.  Discharge instructions for post care reviewed with patient's son at the bedside with patient.  P: Discharged to self care.  Patient to follow up with appts as per discharge instruction.

## 2021-10-15 ENCOUNTER — ALLIED HEALTH/NURSE VISIT (OUTPATIENT)
Dept: CARDIOLOGY | Facility: CLINIC | Age: 81
End: 2021-10-15
Payer: COMMERCIAL

## 2021-10-15 ENCOUNTER — PREP FOR PROCEDURE (OUTPATIENT)
Dept: CARDIOLOGY | Facility: CLINIC | Age: 81
End: 2021-10-15

## 2021-10-15 VITALS
DIASTOLIC BLOOD PRESSURE: 50 MMHG | RESPIRATION RATE: 16 BRPM | HEART RATE: 70 BPM | WEIGHT: 149 LBS | SYSTOLIC BLOOD PRESSURE: 120 MMHG | BODY MASS INDEX: 25.44 KG/M2 | HEIGHT: 64 IN

## 2021-10-15 DIAGNOSIS — Z95.818 PRESENCE OF LEFT ATRIAL APPENDAGE CLOSURE DEVICE COMPOSED OF NICKEL-TITANIUM ALLOY WITH POLYETHYLENE TEREPHTHALATE MEMBRANE: Primary | ICD-10-CM

## 2021-10-15 PROCEDURE — 99207 PR NO CHARGE NURSE ONLY: CPT

## 2021-10-15 RX ORDER — SODIUM CHLORIDE 9 MG/ML
INJECTION, SOLUTION INTRAVENOUS CONTINUOUS
Status: CANCELLED | OUTPATIENT
Start: 2021-10-15

## 2021-10-15 RX ORDER — LIDOCAINE 40 MG/G
CREAM TOPICAL
Status: CANCELLED | OUTPATIENT
Start: 2021-10-15

## 2021-10-15 ASSESSMENT — MIFFLIN-ST. JEOR: SCORE: 1120.86

## 2021-10-15 NOTE — NURSING NOTE
Pt was seen in clinic for post op 24mm Watchman Flx device placement with Dr. Velázquez on 10/13/21.    Pt VSS, LSC, denies SOB, and palpable pedal pulses.    No peripheral edema noted, no abdominal bloating or discomfort.     Pt denies any neurological changes at this time.    Pt denies generalized or localized pain at this time.   Pt is having bowel movements and is voiding without difficulty.      Pt right groin has 1 puncture site, is C/D/I, no drainage, slight bruising noted around puncture site, 1 suture intact, groin is soft, and pt denies pain at the site.    Suture was removed from the right groin site without complication and bandaid was applied.    Pt continues anticoagulation: Eliquis and daily 81 mg Aspirin.    Reviewed post op LAAC healing process, f/u apts, physical restrictions, nutritional requirements, when to contact the heart clinic, and contact information was given to the pt for further concerns or questions.  Pt verbalized understanding.     Julissa Dhaliwal RN BSN  Structural Heart Coordinator   [facility]  [phone]

## 2021-10-15 NOTE — PATIENT INSTRUCTIONS
Your anticoagulation medication (Eliquis):      It is important to remain on your anticoagulation medication uninterrupted after your left atrial occlusion device implant to reduce your risk of a stroke or heart attack, DO NOT STOP THIS MEDICATION.      Healing from your left atrial closure device implant:      Stay well hydrated, it is important to increase your fluid intake during your recovery period.    Eat foods high in protein to help the healing process.    Gradually increase your activity over the several days, back to baseline;  No aggressive exercise for 5 days post-procedure.    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    No lifting more that 10 lb for 5 days after procedure.    No driving for 3 days post-procedure.     Keep your incision site clean, dry and open to air.    Ok to use ice packs at groin sites for 20 minutes at a time for groin discomfort.     Please delay any dental procedures until 6 weeks post implant. You will not require anti-biotic prophylaxis treatment after this time frame.    If you need urgent dental care prior to the 6 week post-procedure restriction, please contact your cardiologist for prophylactic antibiotic.     Please call me if any of the following occur:      Shortness of breath, dizziness, or chest pain.    Changes in your groin sites including:  Swelling, hardening, drainage, increase in bruising or an increase in pain.          Dial 911 for signs/symptoms of a stroke:      New onset visual disturbance.    New problems with talking/speech.    Smile only occurs on half your face.    Numbness on one side of your body or face.    Sudden headache.    New onset of confusion.    New problems with walking or balance.     Important information regarding your future follow up appointments:      45 Day post-implant follow-up with our Advance Practice Practitioner (GUADALUPE)    45 Day post-implant VASQUEZ to assess your left atrial appendage  closure device.    You will be contacted after your VASQUEZ is complete to discuss anticoagulation instructions. Please continue your (Eliquis) until notified with your post-implant VASQUEZ results.    Thank you,    Julissa Dhaliwal, Structural Heart Coordinator RN, [phone]  Jacinda Keyes, Structural Heart Coordinator RN, [phone]    After hours please contact the on call service at [phone]

## 2021-11-10 DIAGNOSIS — I48.91 ATRIAL FIBRILLATION (H): ICD-10-CM

## 2021-11-16 DIAGNOSIS — I48.91 ATRIAL FIBRILLATION (H): ICD-10-CM

## 2021-11-23 ENCOUNTER — OFFICE VISIT (OUTPATIENT)
Dept: CARDIOLOGY | Facility: CLINIC | Age: 81
End: 2021-11-23
Payer: MEDICARE

## 2021-11-23 ENCOUNTER — LAB (OUTPATIENT)
Dept: FAMILY MEDICINE | Facility: CLINIC | Age: 81
End: 2021-11-23
Payer: MEDICARE

## 2021-11-23 VITALS
RESPIRATION RATE: 18 BRPM | DIASTOLIC BLOOD PRESSURE: 64 MMHG | WEIGHT: 147 LBS | SYSTOLIC BLOOD PRESSURE: 106 MMHG | HEIGHT: 65 IN | HEART RATE: 72 BPM | BODY MASS INDEX: 24.49 KG/M2

## 2021-11-23 DIAGNOSIS — I10 ESSENTIAL HYPERTENSION: ICD-10-CM

## 2021-11-23 DIAGNOSIS — I48.0 PAROXYSMAL ATRIAL FIBRILLATION (H): Primary | ICD-10-CM

## 2021-11-23 DIAGNOSIS — I48.91 ATRIAL FIBRILLATION (H): ICD-10-CM

## 2021-11-23 DIAGNOSIS — Z95.818 PRESENCE OF LEFT ATRIAL APPENDAGE CLOSURE DEVICE COMPOSED OF NICKEL-TITANIUM ALLOY WITH POLYETHYLENE TEREPHTHALATE MEMBRANE: ICD-10-CM

## 2021-11-23 DIAGNOSIS — Z98.890 STATUS POST CATHETER ABLATION OF ATRIAL FIBRILLATION: ICD-10-CM

## 2021-11-23 DIAGNOSIS — Z20.822 COVID-19 RULED OUT: ICD-10-CM

## 2021-11-23 PROCEDURE — 99214 OFFICE O/P EST MOD 30 MIN: CPT | Performed by: INTERNAL MEDICINE

## 2021-11-23 PROCEDURE — U0005 INFEC AGEN DETEC AMPLI PROBE: HCPCS

## 2021-11-23 PROCEDURE — U0003 INFECTIOUS AGENT DETECTION BY NUCLEIC ACID (DNA OR RNA); SEVERE ACUTE RESPIRATORY SYNDROME CORONAVIRUS 2 (SARS-COV-2) (CORONAVIRUS DISEASE [COVID-19]), AMPLIFIED PROBE TECHNIQUE, MAKING USE OF HIGH THROUGHPUT TECHNOLOGIES AS DESCRIBED BY CMS-2020-01-R: HCPCS

## 2021-11-23 RX ORDER — CLOBETASOL PROPIONATE 0.5 MG/G
OINTMENT TOPICAL PRN
COMMUNITY
End: 2022-04-28

## 2021-11-23 ASSESSMENT — MIFFLIN-ST. JEOR: SCORE: 1127.67

## 2021-11-23 NOTE — PATIENT INSTRUCTIONS
Estrellita Herring,    It was a pleasure speaking with you today in the clinic regarding your upcoming VASQUEZ.     IMPORTANT INFORMATION REGARDING YOUR      Transesophageal Echocardiogram (VASQUEZ) and Left Atrial Appendage Occlusion Device Implant    Covid Test: today at 2 pm      Transesophageal Echocardiogram (VASQUEZ) : Friday, Nov 26      Please arrive at 9:45 am for registration and preparation for your procedure.     Have nothing to eat or drink after midnight the night prior to your procedure.     Please take all of your medications with a small sip of water prior to coming in for you procedure, with the exception of any vitamins/minerals, diabetic agents or diuretics.     You will require a responsible  to bring you home following this procedure.    It is important to remain on your anticoagulation medication Eliquis uninterrupted before and after your procedure.    You will be contacted with results, following review by one of our implanting doctors    Once we ensure that the device is stable, there is no blood flow into the pouch and no clots on the surface of the device, you will be instructed to stop your Eliquis    Once you stop the Eliquis, you will continue your baby aspirin (81 mg) daily and start Plavix 75 mg daily.  The plavix Rx will be sent into your pharmacy    Parking information:       Please arrive at [facility], Located at: [facility] [place] [address]    Please park in Lot A if open (Lot B is overflow for patients).    Please check in at the main hospital desk at [facility]     From there you will be directed downstairs to the radiology deck where you will check in with heart care for your procedure.     - if you have questions, please call:  Lenny Brice coordinator RN @ [phone]    You should followup with us in April for your 6 month visit - the scheduling team will call you closer to that date to schedule    Your  last dose of Plavix will be April 13        After hours/scheduling line  [phone]

## 2021-11-23 NOTE — H&P (VIEW-ONLY)
HEART CARE ENCOUNTER NOTE       [facility]  [phone]      Assessment/Recommendations   1.  Paroxysmal atrial fibrillation - She is status po October 13 st watchman implant on October 13.  She is doing well postprocedure and has had no neurologic events or symptoms.  At this time she is on Eliquis and aspirin.  She is scheduled to have her postprocedure VASQUEZ on November 26.  If the VASQUEZ shows that there is no leakage around the watchman device, she will be called and instructed to stop the Eliquis. She will then continue aspirin 81 mg daily and start Plavix 75 mg daily.  She will stay on the Plavix until April, at which time she will see us for her 6-month follow-up visit.    She understands all the instructions and his questions have been answered.  She is ready to proceed with VASQUEZ.    MODIFIED LESLIE SCALE   Timepoint: 4-6 wk Post-LAAC    Previous score: 0    Score Description   0 No symptoms at all   1 No significant disability despite symptoms; able to carry out all usual duties and activities   2 Slight disability; unable to carry out all previous activities, but able to look after own affairs without assistance   3 Moderate disability; requiring some help, but able to walk without assistance   4 Moderately severe disability; unable to walk without assistance and unable to attend to own bodily needs without assistance   5 Severe disability; bedridden, incontinent and requiring constant nursing care and attention   6 Dead    Total score (0 - 6):  0    Change in score if s/p LAAC? No    2.  Hypertension -blood pressure today is well controlled.  Patient will keep taking metoprolol succinate 25 mg daily    3.  Chronic anemia -since at least 2019.  Hemoglobin is between 8.3 and 10.4.  Last hemoglobin on October 13 was 9.  Patient's fatigue could be related to this.  Because this is a chronic issue, I have recommended talking with her PCP.  She has had iron studies done prior in my review of her  chart records.  She is currently not on iron supplementation because it caused constipation    4.  Fatigue -patient says she has had fatigue ever since her diagnosis of atrial fibrillation, but she is currently in normal sinus rhythm so the atrial fibrillation cannot be blamed.  It could be due to the fact that she does have insomnia and chronic anemia.       History of Present Illness/Subjective    Estrellita Herring is a 81 year old female who comes in today for history and physical prior to 45-day VASQUEZ after watchman implant.    Estrellita Herring has a past history of atrial fibrillation, hypertension, ankylosing spondylitis and chronic anemia.  She was first diagnosed with atrial fibrillation in 2018 and was initially treated with medication but continued to have breakthrough episodes.  She underwent PVI in October 2019 and has not had any documented recurrence since that time.  Her chronic anemia has been followed by her PCP and there has never been an identifiable source of bleeding    She underwent watchman implant on October 13.  Since implant she continues to be tired.  She has had no strokelike symptoms or events.  She otherwise denies chest discomfort, palpitations, shortness of breath, paroxysmal nocturnal dyspnea, orthopnea, lightheadedness, dizziness, pre-syncope, or syncope.  Estrellita Herring also denies any weight loss, changes in appetite, nausea or vomiting.     Medical, surgical, family, social history, and medications were reviewed and updated as necessary.    Procedural VASQUEZ results (from Oct 13):  Interpretation Summary  Structural VASQUEZ for Left Atrial Occlusion Device     Pre-Device:  1. Normal left ventricular size and systolic function. LVEF:55%  2. No left atrial thrombus or spontaneous contrast. Mild left atrial  enlargement.  3. No ASD or PFO by color flow.  4. No pericardial effusion.     Post Device:  1. Well seated 24 m Watchman FLX Device in left atrial appendage by 2D and 3D  imaging. No  "color doppler evidence of flow around device at ostium insertion  before or after device release. No change in mitral valve function. No  thrombus noted on device, LA or FRANCISCO.     FRANCISCO device measurements:  Device compression diameter:  Post deployment  0Â : 21.5 mm  45Â : 21 mm  90Â : 21.6 mm  135 Â : 22 mm     2. Normal left ventricular size and systolic function. LVEF:55%  3. Small ASD with unidirectional flow (left to right) by color flow doppler.  4. No post procedural pericardial effusion.     Physical Examination Review of Systems   Vitals: /64 (BP Location: Left arm, Patient Position: Sitting, Cuff Size: Adult Regular)   Pulse 72   Resp 18   Ht 1.651 m (5' 5\")   Wt 66.7 kg (147 lb)   BMI 24.46 kg/m    BMI= Body mass index is 24.46 kg/m .  Wt Readings from Last 3 Encounters:   11/23/21 66.7 kg (147 lb)   10/15/21 67.6 kg (149 lb)   10/13/21 65.5 kg (144 lb 6.4 oz)       General Appearance:   Alert, cooperative and in no acute distress   ENT/Mouth: membranes moist, no oral lesions or bleeding gums.      EYES:  no scleral icterus, normal conjunctivae   Neck: Thyroid not visualized   Chest/Lungs:   lungs are clear to auscultation, no rales or wheezing   Cardiovascular:   Regular . Normal first and second heart sounds with no murmurs, rubs or gallops; the carotid, radial and posterior tibial pulses are intact, no edema bilaterally    Abdomen:  Soft and nontender. Bowel sounds are present in all quadrants   Extremities: no cyanosis or clubbing   Skin: no xanthelasma, warm.    Neurologic: normal gait, normal  bilateral, no tremors   Psychiatric: Normal mood and affect       Please refer above for cardiac ROS details.      Medical History  Surgical History Family History Social History   Past Medical History:   Diagnosis Date     Ankylosis of spine      Atrial fibrillation (H)      Diverticulitis      Hypertension      Iritis      Past Surgical History:   Procedure Laterality Date     CATARACT " EXTRACTION, BILATERAL       COLECTOMY  2006    partial     CV LEFT ATRIAL APPENDAGE CLOSURE N/A 10/13/2021    Procedure: CV LEFT ATRIAL APPENDAGE CLOSURE;  Surgeon: Andrey Velázquez MD;  Location: [facility] CARDIAC CATH LAB     EP ABLATION  10/1/2019          EP ABLATION PVI Left 10/1/2019    Procedure: EP Ablation PVI;  Surgeon: Gudelia Strong MD;  Location: [facility] Cath Lab;  Service: Cardiology     TOTAL KNEE ARTHROPLASTY Left      Family History   Problem Relation Age of Onset     Alzheimer Disease Mother      Cancer Father         prostate     Atrial fibrillation Sister      Diabetes Type 2  Sister     Social History     Socioeconomic History     Marital status:      Spouse name: Not on file     Number of children: Not on file     Years of education: Not on file     Highest education level: Not on file   Occupational History     Not on file   Tobacco Use     Smoking status: Never Smoker     Smokeless tobacco: Never Used   Substance and Sexual Activity     Alcohol use: No     Drug use: No     Sexual activity: Not on file   Other Topics Concern     Not on file   Social History Narrative     Not on file     Social Determinants of Health     Financial Resource Strain: Not on file   Food Insecurity: Not on file   Transportation Needs: Not on file   Physical Activity: Not on file   Stress: Not on file   Social Connections: Not on file   Intimate Partner Violence: Not on file   Housing Stability: Not on file          Medications  Allergies   Current Outpatient Medications   Medication Sig Dispense Refill     apixaban ANTICOAGULANT (ELIQUIS ANTICOAGULANT) 5 MG tablet Take 1 tablet (5 mg) by mouth 2 times daily Pt on for limited time. 180 tablet 1     aspirin (ASA) 81 MG EC tablet Take 1 tablet (81 mg) by mouth daily       clobetasol (TEMOVATE) 0.05 % external ointment as needed       inFLIXimab (REMICADE) 100 mg injection [INFLIXIMAB (REMICADE) 100 MG INJECTION] Infuse 400 mg into a venous catheter every 2  (two) months.       metoprolol succinate (TOPROL-XL) 25 MG [METOPROLOL SUCCINATE (TOPROL-XL) 25 MG] Take 1 tablet (25 mg total) by mouth daily. 90 tablet 3     Multiple Vitamin (MULTIVITAMIN ADULT PO) Take 1 tablet by mouth daily       omeprazole (PRILOSEC) 20 MG capsule [OMEPRAZOLE (PRILOSEC) 20 MG CAPSULE] TAKE 1 CAPSULE BY MOUTH ONCE DAILY OCCASIONALLY TWICE DAILY       prednisoLONE acetate (PREDNISOLONE ACETATE P-F) 1 % ophthalmic suspension Place 1 drop Into the left eye 4 times daily as needed        vit C/E/Zn/coppr/lutein/zeaxan (PRESERVISION AREDS-2 ORAL) [VIT C/E/ZN/COPPR/LUTEIN/ZEAXAN (PRESERVISION AREDS-2 ORAL)] Take 1 tablet by mouth 2 (two) times a day.      Allergies   Allergen Reactions     Sulfa (Sulfonamide Antibiotics) [Sulfa Drugs] Rash     hallucinations         Lab Results    Chemistry/lipid CBC Cardiac Enzymes/BNP/TSH/INR   Recent Labs   Lab Test 10/30/20  1142   CHOL 155   HDL 32*   LDL 93   TRIG 151*     Recent Labs   Lab Test 10/30/20  1142 05/01/18  1619 04/06/17  1447   LDL 93 114 103     Recent Labs   Lab Test 10/13/21  1324 10/13/21  0733 10/11/21  0940   NA  --   --  139   POTASSIUM  --   --  4.4   CHLORIDE  --   --  104   CO2  --   --  29   GLC  --  84 95   BUN  --   --  14   CR 0.70  --  0.76   GFRESTIMATED 81  --  73   RIKC  --   --  10.4     Recent Labs   Lab Test 10/13/21  1324 10/11/21  0940 08/05/21  1348   CR 0.70 0.76 0.76     No results for input(s): A1C in the last 33332 hours. Recent Labs   Lab Test 10/13/21  1324 10/11/21  0940   WBC  --  6.6   HGB 9.0* 10.1*   HCT  --  34.2*   MCV  --  84   PLT  --  333     Recent Labs   Lab Test 10/13/21  1324 10/11/21  0940 08/05/21  1348   HGB 9.0* 10.1* 10.4*    No results for input(s): TROPONINI in the last 54040 hours.  No results for input(s): BNP, NTBNPI, NTBNP in the last 90779 hours.  Recent Labs   Lab Test 10/22/18  1254   TSH 2.30     No results for input(s): INR in the last 75295 hours.     36 minutes spent on the date of  encounter doing chart prep/review, review of test results, interpretation with above tests, patient visit and documentation.      This note has been dictated using voice recognition software. Any grammatical or context distortions are unintentional and inherent to the software.

## 2021-11-23 NOTE — PROGRESS NOTES
HEART CARE ENCOUNTER NOTE       [facility]  [phone]      Assessment/Recommendations   1.  Paroxysmal atrial fibrillation - She is status po October 13 st watchman implant on October 13.  She is doing well postprocedure and has had no neurologic events or symptoms.  At this time she is on Eliquis and aspirin.  She is scheduled to have her postprocedure VASQUEZ on November 26.  If the VASQUEZ shows that there is no leakage around the watchman device, she will be called and instructed to stop the Eliquis. She will then continue aspirin 81 mg daily and start Plavix 75 mg daily.  She will stay on the Plavix until April, at which time she will see us for her 6-month follow-up visit.    She understands all the instructions and his questions have been answered.  She is ready to proceed with VASQUEZ.    MODIFIED LESLIE SCALE   Timepoint: 4-6 wk Post-LAAC    Previous score: 0    Score Description   0 No symptoms at all   1 No significant disability despite symptoms; able to carry out all usual duties and activities   2 Slight disability; unable to carry out all previous activities, but able to look after own affairs without assistance   3 Moderate disability; requiring some help, but able to walk without assistance   4 Moderately severe disability; unable to walk without assistance and unable to attend to own bodily needs without assistance   5 Severe disability; bedridden, incontinent and requiring constant nursing care and attention   6 Dead    Total score (0 - 6):  0    Change in score if s/p LAAC? No    2.  Hypertension -blood pressure today is well controlled.  Patient will keep taking metoprolol succinate 25 mg daily    3.  Chronic anemia -since at least 2019.  Hemoglobin is between 8.3 and 10.4.  Last hemoglobin on October 13 was 9.  Patient's fatigue could be related to this.  Because this is a chronic issue, I have recommended talking with her PCP.  She has had iron studies done prior in my review of her  chart records.  She is currently not on iron supplementation because it caused constipation    4.  Fatigue -patient says she has had fatigue ever since her diagnosis of atrial fibrillation, but she is currently in normal sinus rhythm so the atrial fibrillation cannot be blamed.  It could be due to the fact that she does have insomnia and chronic anemia.       History of Present Illness/Subjective    Estrellita Herring is a 81 year old female who comes in today for history and physical prior to 45-day VASQUEZ after watchman implant.    Estrellita Herring has a past history of atrial fibrillation, hypertension, ankylosing spondylitis and chronic anemia.  She was first diagnosed with atrial fibrillation in 2018 and was initially treated with medication but continued to have breakthrough episodes.  She underwent PVI in October 2019 and has not had any documented recurrence since that time.  Her chronic anemia has been followed by her PCP and there has never been an identifiable source of bleeding    She underwent watchman implant on October 13.  Since implant she continues to be tired.  She has had no strokelike symptoms or events.  She otherwise denies chest discomfort, palpitations, shortness of breath, paroxysmal nocturnal dyspnea, orthopnea, lightheadedness, dizziness, pre-syncope, or syncope.  Estrellita Herring also denies any weight loss, changes in appetite, nausea or vomiting.     Medical, surgical, family, social history, and medications were reviewed and updated as necessary.    Procedural VASQUEZ results (from Oct 13):  Interpretation Summary  Structural VASQUEZ for Left Atrial Occlusion Device     Pre-Device:  1. Normal left ventricular size and systolic function. LVEF:55%  2. No left atrial thrombus or spontaneous contrast. Mild left atrial  enlargement.  3. No ASD or PFO by color flow.  4. No pericardial effusion.     Post Device:  1. Well seated 24 m Watchman FLX Device in left atrial appendage by 2D and 3D  imaging. No  "color doppler evidence of flow around device at ostium insertion  before or after device release. No change in mitral valve function. No  thrombus noted on device, LA or FRANCISCO.     FRANCISCO device measurements:  Device compression diameter:  Post deployment  0Â : 21.5 mm  45Â : 21 mm  90Â : 21.6 mm  135 Â : 22 mm     2. Normal left ventricular size and systolic function. LVEF:55%  3. Small ASD with unidirectional flow (left to right) by color flow doppler.  4. No post procedural pericardial effusion.     Physical Examination Review of Systems   Vitals: /64 (BP Location: Left arm, Patient Position: Sitting, Cuff Size: Adult Regular)   Pulse 72   Resp 18   Ht 1.651 m (5' 5\")   Wt 66.7 kg (147 lb)   BMI 24.46 kg/m    BMI= Body mass index is 24.46 kg/m .  Wt Readings from Last 3 Encounters:   11/23/21 66.7 kg (147 lb)   10/15/21 67.6 kg (149 lb)   10/13/21 65.5 kg (144 lb 6.4 oz)       General Appearance:   Alert, cooperative and in no acute distress   ENT/Mouth: membranes moist, no oral lesions or bleeding gums.      EYES:  no scleral icterus, normal conjunctivae   Neck: Thyroid not visualized   Chest/Lungs:   lungs are clear to auscultation, no rales or wheezing   Cardiovascular:   Regular . Normal first and second heart sounds with no murmurs, rubs or gallops; the carotid, radial and posterior tibial pulses are intact, no edema bilaterally    Abdomen:  Soft and nontender. Bowel sounds are present in all quadrants   Extremities: no cyanosis or clubbing   Skin: no xanthelasma, warm.    Neurologic: normal gait, normal  bilateral, no tremors   Psychiatric: Normal mood and affect       Please refer above for cardiac ROS details.      Medical History  Surgical History Family History Social History   Past Medical History:   Diagnosis Date     Ankylosis of spine      Atrial fibrillation (H)      Diverticulitis      Hypertension      Iritis      Past Surgical History:   Procedure Laterality Date     CATARACT " EXTRACTION, BILATERAL       COLECTOMY  2006    partial     CV LEFT ATRIAL APPENDAGE CLOSURE N/A 10/13/2021    Procedure: CV LEFT ATRIAL APPENDAGE CLOSURE;  Surgeon: Andrey Velázquez MD;  Location: [facility] CARDIAC CATH LAB     EP ABLATION  10/1/2019          EP ABLATION PVI Left 10/1/2019    Procedure: EP Ablation PVI;  Surgeon: Gudelia Strong MD;  Location: [facility] Cath Lab;  Service: Cardiology     TOTAL KNEE ARTHROPLASTY Left      Family History   Problem Relation Age of Onset     Alzheimer Disease Mother      Cancer Father         prostate     Atrial fibrillation Sister      Diabetes Type 2  Sister     Social History     Socioeconomic History     Marital status:      Spouse name: Not on file     Number of children: Not on file     Years of education: Not on file     Highest education level: Not on file   Occupational History     Not on file   Tobacco Use     Smoking status: Never Smoker     Smokeless tobacco: Never Used   Substance and Sexual Activity     Alcohol use: No     Drug use: No     Sexual activity: Not on file   Other Topics Concern     Not on file   Social History Narrative     Not on file     Social Determinants of Health     Financial Resource Strain: Not on file   Food Insecurity: Not on file   Transportation Needs: Not on file   Physical Activity: Not on file   Stress: Not on file   Social Connections: Not on file   Intimate Partner Violence: Not on file   Housing Stability: Not on file          Medications  Allergies   Current Outpatient Medications   Medication Sig Dispense Refill     apixaban ANTICOAGULANT (ELIQUIS ANTICOAGULANT) 5 MG tablet Take 1 tablet (5 mg) by mouth 2 times daily Pt on for limited time. 180 tablet 1     aspirin (ASA) 81 MG EC tablet Take 1 tablet (81 mg) by mouth daily       clobetasol (TEMOVATE) 0.05 % external ointment as needed       inFLIXimab (REMICADE) 100 mg injection [INFLIXIMAB (REMICADE) 100 MG INJECTION] Infuse 400 mg into a venous catheter every 2  (two) months.       metoprolol succinate (TOPROL-XL) 25 MG [METOPROLOL SUCCINATE (TOPROL-XL) 25 MG] Take 1 tablet (25 mg total) by mouth daily. 90 tablet 3     Multiple Vitamin (MULTIVITAMIN ADULT PO) Take 1 tablet by mouth daily       omeprazole (PRILOSEC) 20 MG capsule [OMEPRAZOLE (PRILOSEC) 20 MG CAPSULE] TAKE 1 CAPSULE BY MOUTH ONCE DAILY OCCASIONALLY TWICE DAILY       prednisoLONE acetate (PREDNISOLONE ACETATE P-F) 1 % ophthalmic suspension Place 1 drop Into the left eye 4 times daily as needed        vit C/E/Zn/coppr/lutein/zeaxan (PRESERVISION AREDS-2 ORAL) [VIT C/E/ZN/COPPR/LUTEIN/ZEAXAN (PRESERVISION AREDS-2 ORAL)] Take 1 tablet by mouth 2 (two) times a day.      Allergies   Allergen Reactions     Sulfa (Sulfonamide Antibiotics) [Sulfa Drugs] Rash     hallucinations         Lab Results    Chemistry/lipid CBC Cardiac Enzymes/BNP/TSH/INR   Recent Labs   Lab Test 10/30/20  1142   CHOL 155   HDL 32*   LDL 93   TRIG 151*     Recent Labs   Lab Test 10/30/20  1142 05/01/18  1619 04/06/17  1447   LDL 93 114 103     Recent Labs   Lab Test 10/13/21  1324 10/13/21  0733 10/11/21  0940   NA  --   --  139   POTASSIUM  --   --  4.4   CHLORIDE  --   --  104   CO2  --   --  29   GLC  --  84 95   BUN  --   --  14   CR 0.70  --  0.76   GFRESTIMATED 81  --  73   RICK  --   --  10.4     Recent Labs   Lab Test 10/13/21  1324 10/11/21  0940 08/05/21  1348   CR 0.70 0.76 0.76     No results for input(s): A1C in the last 72897 hours. Recent Labs   Lab Test 10/13/21  1324 10/11/21  0940   WBC  --  6.6   HGB 9.0* 10.1*   HCT  --  34.2*   MCV  --  84   PLT  --  333     Recent Labs   Lab Test 10/13/21  1324 10/11/21  0940 08/05/21  1348   HGB 9.0* 10.1* 10.4*    No results for input(s): TROPONINI in the last 17638 hours.  No results for input(s): BNP, NTBNPI, NTBNP in the last 22387 hours.  Recent Labs   Lab Test 10/22/18  1254   TSH 2.30     No results for input(s): INR in the last 58127 hours.     36 minutes spent on the date of  encounter doing chart prep/review, review of test results, interpretation with above tests, patient visit and documentation.      This note has been dictated using voice recognition software. Any grammatical or context distortions are unintentional and inherent to the software.

## 2021-11-23 NOTE — LETTER
11/23/2021    Chai Quintero MD, MD  [facility] [address]  [address] [place] [address]    RE: Estrellita Herring       Dear Colleague,    I had the pleasure of seeing Estrellita Herring in the [facility].    HEART CARE ENCOUNTER NOTE       [facility]  [phone]      Assessment/Recommendations   1.  Paroxysmal atrial fibrillation - She is status po October 13 st watchman implant on October 13.  She is doing well postprocedure and has had no neurologic events or symptoms.  At this time she is on Eliquis and aspirin.  She is scheduled to have her postprocedure VASQUEZ on November 26.  If the VASQUEZ shows that there is no leakage around the watchman device, she will be called and instructed to stop the Eliquis. She will then continue aspirin 81 mg daily and start Plavix 75 mg daily.  She will stay on the Plavix until April, at which time she will see us for her 6-month follow-up visit.    She understands all the instructions and his questions have been answered.  She is ready to proceed with VASQUEZ.    MODIFIED LESLIE SCALE   Timepoint: 4-6 wk Post-LAAC    Previous score: 0    Score Description   0 No symptoms at all   1 No significant disability despite symptoms; able to carry out all usual duties and activities   2 Slight disability; unable to carry out all previous activities, but able to look after own affairs without assistance   3 Moderate disability; requiring some help, but able to walk without assistance   4 Moderately severe disability; unable to walk without assistance and unable to attend to own bodily needs without assistance   5 Severe disability; bedridden, incontinent and requiring constant nursing care and attention   6 Dead    Total score (0 - 6):  0    Change in score if s/p LAAC? No    2.  Hypertension -blood pressure today is well controlled.  Patient will keep taking metoprolol succinate 25 mg daily    3.  Chronic  anemia -since at least 2019.  Hemoglobin is between 8.3 and 10.4.  Last hemoglobin on October 13 was 9.  Patient's fatigue could be related to this.  Because this is a chronic issue, I have recommended talking with her PCP.  She has had iron studies done prior in my review of her chart records.  She is currently not on iron supplementation because it caused constipation    4.  Fatigue -patient says she has had fatigue ever since her diagnosis of atrial fibrillation, but she is currently in normal sinus rhythm so the atrial fibrillation cannot be blamed.  It could be due to the fact that she does have insomnia and chronic anemia.       History of Present Illness/Subjective    Estrellita Herring is a 81 year old female who comes in today for history and physical prior to 45-day VASQUEZ after watchman implant.    Estrellita Herring has a past history of atrial fibrillation, hypertension, ankylosing spondylitis and chronic anemia.  She was first diagnosed with atrial fibrillation in 2018 and was initially treated with medication but continued to have breakthrough episodes.  She underwent PVI in October 2019 and has not had any documented recurrence since that time.  Her chronic anemia has been followed by her PCP and there has never been an identifiable source of bleeding    She underwent watchman implant on October 13.  Since implant she continues to be tired.  She has had no strokelike symptoms or events.  She otherwise denies chest discomfort, palpitations, shortness of breath, paroxysmal nocturnal dyspnea, orthopnea, lightheadedness, dizziness, pre-syncope, or syncope.  Estrellita Herring also denies any weight loss, changes in appetite, nausea or vomiting.     Medical, surgical, family, social history, and medications were reviewed and updated as necessary.    Procedural VASQUEZ results (from Oct 13):  Interpretation Summary  Structural VASQUEZ for Left Atrial Occlusion Device     Pre-Device:  1. Normal left ventricular size and  "systolic function. LVEF:55%  2. No left atrial thrombus or spontaneous contrast. Mild left atrial  enlargement.  3. No ASD or PFO by color flow.  4. No pericardial effusion.     Post Device:  1. Well seated 24 m Watchman FLX Device in left atrial appendage by 2D and 3D  imaging. No color doppler evidence of flow around device at ostium insertion  before or after device release. No change in mitral valve function. No  thrombus noted on device, LA or FRANCISCO.     FRANCISCO device measurements:  Device compression diameter:  Post deployment  0Â : 21.5 mm  45Â : 21 mm  90Â : 21.6 mm  135 Â : 22 mm     2. Normal left ventricular size and systolic function. LVEF:55%  3. Small ASD with unidirectional flow (left to right) by color flow doppler.  4. No post procedural pericardial effusion.     Physical Examination Review of Systems   Vitals: /64 (BP Location: Left arm, Patient Position: Sitting, Cuff Size: Adult Regular)   Pulse 72   Resp 18   Ht 1.651 m (5' 5\")   Wt 66.7 kg (147 lb)   BMI 24.46 kg/m    BMI= Body mass index is 24.46 kg/m .  Wt Readings from Last 3 Encounters:   11/23/21 66.7 kg (147 lb)   10/15/21 67.6 kg (149 lb)   10/13/21 65.5 kg (144 lb 6.4 oz)       General Appearance:   Alert, cooperative and in no acute distress   ENT/Mouth: membranes moist, no oral lesions or bleeding gums.      EYES:  no scleral icterus, normal conjunctivae   Neck: Thyroid not visualized   Chest/Lungs:   lungs are clear to auscultation, no rales or wheezing   Cardiovascular:   Regular . Normal first and second heart sounds with no murmurs, rubs or gallops; the carotid, radial and posterior tibial pulses are intact, no edema bilaterally    Abdomen:  Soft and nontender. Bowel sounds are present in all quadrants   Extremities: no cyanosis or clubbing   Skin: no xanthelasma, warm.    Neurologic: normal gait, normal  bilateral, no tremors   Psychiatric: Normal mood and affect       Please refer above for cardiac ROS details.  "     Medical History  Surgical History Family History Social History   Past Medical History:   Diagnosis Date     Ankylosis of spine      Atrial fibrillation (H)      Diverticulitis      Hypertension      Iritis      Past Surgical History:   Procedure Laterality Date     CATARACT EXTRACTION, BILATERAL       COLECTOMY  2006    partial     CV LEFT ATRIAL APPENDAGE CLOSURE N/A 10/13/2021    Procedure: CV LEFT ATRIAL APPENDAGE CLOSURE;  Surgeon: Andrey Velázquez MD;  Location: [facility] CARDIAC CATH LAB     EP ABLATION  10/1/2019          EP ABLATION PVI Left 10/1/2019    Procedure: EP Ablation PVI;  Surgeon: Gudelia Strong MD;  Location: [facility] Cath Lab;  Service: Cardiology     TOTAL KNEE ARTHROPLASTY Left      Family History   Problem Relation Age of Onset     Alzheimer Disease Mother      Cancer Father         prostate     Atrial fibrillation Sister      Diabetes Type 2  Sister     Social History     Socioeconomic History     Marital status:      Spouse name: Not on file     Number of children: Not on file     Years of education: Not on file     Highest education level: Not on file   Occupational History     Not on file   Tobacco Use     Smoking status: Never Smoker     Smokeless tobacco: Never Used   Substance and Sexual Activity     Alcohol use: No     Drug use: No     Sexual activity: Not on file   Other Topics Concern     Not on file   Social History Narrative     Not on file     Social Determinants of Health     Financial Resource Strain: Not on file   Food Insecurity: Not on file   Transportation Needs: Not on file   Physical Activity: Not on file   Stress: Not on file   Social Connections: Not on file   Intimate Partner Violence: Not on file   Housing Stability: Not on file          Medications  Allergies   Current Outpatient Medications   Medication Sig Dispense Refill     apixaban ANTICOAGULANT (ELIQUIS ANTICOAGULANT) 5 MG tablet Take 1 tablet (5 mg) by mouth 2 times daily Pt on for limited  time. 180 tablet 1     aspirin (ASA) 81 MG EC tablet Take 1 tablet (81 mg) by mouth daily       clobetasol (TEMOVATE) 0.05 % external ointment as needed       inFLIXimab (REMICADE) 100 mg injection [INFLIXIMAB (REMICADE) 100 MG INJECTION] Infuse 400 mg into a venous catheter every 2 (two) months.       metoprolol succinate (TOPROL-XL) 25 MG [METOPROLOL SUCCINATE (TOPROL-XL) 25 MG] Take 1 tablet (25 mg total) by mouth daily. 90 tablet 3     Multiple Vitamin (MULTIVITAMIN ADULT PO) Take 1 tablet by mouth daily       omeprazole (PRILOSEC) 20 MG capsule [OMEPRAZOLE (PRILOSEC) 20 MG CAPSULE] TAKE 1 CAPSULE BY MOUTH ONCE DAILY OCCASIONALLY TWICE DAILY       prednisoLONE acetate (PREDNISOLONE ACETATE P-F) 1 % ophthalmic suspension Place 1 drop Into the left eye 4 times daily as needed        vit C/E/Zn/coppr/lutein/zeaxan (PRESERVISION AREDS-2 ORAL) [VIT C/E/ZN/COPPR/LUTEIN/ZEAXAN (PRESERVISION AREDS-2 ORAL)] Take 1 tablet by mouth 2 (two) times a day.      Allergies   Allergen Reactions     Sulfa (Sulfonamide Antibiotics) [Sulfa Drugs] Rash     hallucinations         Lab Results    Chemistry/lipid CBC Cardiac Enzymes/BNP/TSH/INR   Recent Labs   Lab Test 10/30/20  1142   CHOL 155   HDL 32*   LDL 93   TRIG 151*     Recent Labs   Lab Test 10/30/20  1142 05/01/18  1619 04/06/17  1447   LDL 93 114 103     Recent Labs   Lab Test 10/13/21  1324 10/13/21  0733 10/11/21  0940   NA  --   --  139   POTASSIUM  --   --  4.4   CHLORIDE  --   --  104   CO2  --   --  29   GLC  --  84 95   BUN  --   --  14   CR 0.70  --  0.76   GFRESTIMATED 81  --  73   RICK  --   --  10.4     Recent Labs   Lab Test 10/13/21  1324 10/11/21  0940 08/05/21  1348   CR 0.70 0.76 0.76     No results for input(s): A1C in the last 51798 hours. Recent Labs   Lab Test 10/13/21  1324 10/11/21  0940   WBC  --  6.6   HGB 9.0* 10.1*   HCT  --  34.2*   MCV  --  84   PLT  --  333     Recent Labs   Lab Test 10/13/21  1324 10/11/21  0940 08/05/21  1348   HGB 9.0* 10.1*  10.4*    No results for input(s): TROPONINI in the last 24797 hours.  No results for input(s): BNP, NTBNPI, NTBNP in the last 46470 hours.  Recent Labs   Lab Test 10/22/18  1254   TSH 2.30     No results for input(s): INR in the last 00880 hours.     36 minutes spent on the date of encounter doing chart prep/review, review of test results, interpretation with above tests, patient visit and documentation.      This note has been dictated using voice recognition software. Any grammatical or context distortions are unintentional and inherent to the software.        Thank you for allowing me to participate in the care of your patient.      Sincerely,     STEVE Ledesma [facility]  cc:   No referring provider defined for this encounter.

## 2021-11-24 LAB — SARS-COV-2 RNA RESP QL NAA+PROBE: NEGATIVE

## 2021-11-26 ENCOUNTER — HOSPITAL ENCOUNTER (OUTPATIENT)
Dept: CARDIOLOGY | Facility: HOSPITAL | Age: 81
Discharge: HOME | End: 2021-11-26
Attending: INTERNAL MEDICINE | Admitting: INTERNAL MEDICINE
Payer: COMMERCIAL

## 2021-11-26 VITALS
OXYGEN SATURATION: 99 % | DIASTOLIC BLOOD PRESSURE: 60 MMHG | RESPIRATION RATE: 23 BRPM | HEART RATE: 64 BPM | SYSTOLIC BLOOD PRESSURE: 122 MMHG | TEMPERATURE: 97.9 F

## 2021-11-26 DIAGNOSIS — I48.0 PAROXYSMAL ATRIAL FIBRILLATION (H): ICD-10-CM

## 2021-11-26 DIAGNOSIS — Z95.818 PRESENCE OF LEFT ATRIAL APPENDAGE CLOSURE DEVICE COMPOSED OF NICKEL-TITANIUM ALLOY WITH POLYETHYLENE TEREPHTHALATE MEMBRANE: ICD-10-CM

## 2021-11-26 LAB — LVEF ECHO: NORMAL

## 2021-11-26 PROCEDURE — 258N000003 HC RX IP 258 OP 636: Performed by: INTERNAL MEDICINE

## 2021-11-26 PROCEDURE — 99152 MOD SED SAME PHYS/QHP 5/>YRS: CPT | Performed by: INTERNAL MEDICINE

## 2021-11-26 PROCEDURE — 93325 DOPPLER ECHO COLOR FLOW MAPG: CPT

## 2021-11-26 PROCEDURE — 93320 DOPPLER ECHO COMPLETE: CPT | Mod: 26 | Performed by: INTERNAL MEDICINE

## 2021-11-26 PROCEDURE — 250N000009 HC RX 250: Performed by: INTERNAL MEDICINE

## 2021-11-26 PROCEDURE — 93320 DOPPLER ECHO COMPLETE: CPT

## 2021-11-26 PROCEDURE — 250N000011 HC RX IP 250 OP 636: Performed by: INTERNAL MEDICINE

## 2021-11-26 PROCEDURE — 93325 DOPPLER ECHO COLOR FLOW MAPG: CPT | Mod: 26 | Performed by: INTERNAL MEDICINE

## 2021-11-26 PROCEDURE — 93312 ECHO TRANSESOPHAGEAL: CPT | Mod: 26 | Performed by: INTERNAL MEDICINE

## 2021-11-26 RX ORDER — LIDOCAINE 40 MG/G
CREAM TOPICAL
Status: DISCONTINUED | OUTPATIENT
Start: 2021-11-26 | End: 2021-11-26 | Stop reason: HOSPADM

## 2021-11-26 RX ORDER — FENTANYL CITRATE 50 UG/ML
INJECTION, SOLUTION INTRAMUSCULAR; INTRAVENOUS
Status: COMPLETED | OUTPATIENT
Start: 2021-11-26 | End: 2021-11-26

## 2021-11-26 RX ORDER — SODIUM CHLORIDE 9 MG/ML
INJECTION, SOLUTION INTRAVENOUS CONTINUOUS
Status: DISCONTINUED | OUTPATIENT
Start: 2021-11-26 | End: 2021-11-26 | Stop reason: HOSPADM

## 2021-11-26 RX ORDER — LIDOCAINE HYDROCHLORIDE 20 MG/ML
SOLUTION OROPHARYNGEAL
Status: COMPLETED | OUTPATIENT
Start: 2021-11-26 | End: 2021-11-26

## 2021-11-26 RX ADMIN — FENTANYL CITRATE 50 MCG: 50 INJECTION, SOLUTION INTRAMUSCULAR; INTRAVENOUS at 10:42

## 2021-11-26 RX ADMIN — LIDOCAINE HYDROCHLORIDE 15 ML: 20 SOLUTION ORAL; TOPICAL at 10:39

## 2021-11-26 RX ADMIN — MIDAZOLAM 2 MG: 1 INJECTION INTRAMUSCULAR; INTRAVENOUS at 10:41

## 2021-11-26 RX ADMIN — BENZOCAINE 4 SPRAY: 220 SPRAY, METERED PERIODONTAL at 10:40

## 2021-11-26 RX ADMIN — SODIUM CHLORIDE: 9 INJECTION, SOLUTION INTRAVENOUS at 10:24

## 2021-11-26 NOTE — DISCHARGE INSTRUCTIONS
1. You are required to have someone accompany you home.    2. Rest today. Do not drive or operate machinery today. Over-activity may produce nausea and dizziness.    3. You should follow your normal diet. Drink plenty of fluids. Do not drink any alcoholic beverages for 24 hours. *(Alcohol may interact with the medications you received today).    4. NO HOT FOODS or LIQUIDS FOR 6 HOURS after the procedure.  You may have hot foods or liquids after 5pm today.    5. You may have a sore throat or cough. This is normal. These symptoms should resolve in 24 hours.     6. If you have further questions call your doctor:

## 2021-11-26 NOTE — PRE-PROCEDURE
GENERAL PRE-PROCEDURE:     Written consent obtained?: Yes    Risks and benefits: Risks, benefits and alternatives were discussed    Consent given by:  Patient  Patient states understanding of procedure being performed: Yes    Patient's understanding of procedure matches consent: Yes    Procedure consent matches procedure scheduled: Yes    Expected level of sedation:  Moderate  Appropriately NPO:  Yes  ASA Class:  2  Mallampati  :  Grade 1- soft palate, uvula, tonsillar pillars, and posterior pharyngeal wall visible  Lungs:  Lungs clear with good breath sounds bilaterally  Heart:  Normal heart sounds and rate  History & Physical reviewed:  History and physical reviewed and no updates needed  Statement of review:  I have reviewed the lab findings, diagnostic data, medications, and the plan for sedation

## 2021-11-30 ENCOUNTER — TELEPHONE (OUTPATIENT)
Dept: CARDIOLOGY | Facility: CLINIC | Age: 81
End: 2021-11-30
Payer: COMMERCIAL

## 2021-11-30 DIAGNOSIS — Z95.818 PRESENCE OF LEFT ATRIAL APPENDAGE CLOSURE DEVICE COMPOSED OF NICKEL-TITANIUM ALLOY WITH POLYETHYLENE TEREPHTHALATE MEMBRANE: Primary | ICD-10-CM

## 2021-11-30 RX ORDER — CLOPIDOGREL BISULFATE 75 MG/1
75 TABLET ORAL DAILY
Qty: 90 TABLET | Refills: 1 | Status: SHIPPED | OUTPATIENT
Start: 2021-11-30 | End: 2022-04-28

## 2021-11-30 NOTE — TELEPHONE ENCOUNTER
----- Message from Janes Schulz MD sent at 11/29/2021  3:00 PM CST -----  She can stop Eliquis and start Plavix per protocol  ----- Message -----  From: Jacinda Keyes RN  Sent: 11/29/2021  10:52 AM CST  To: Janes Schulz MD    Pt ok to stop Eliquis and start plavix per protocol?    Thanks,  Jacinda

## 2021-11-30 NOTE — TELEPHONE ENCOUNTER
Called pt and discussed results and recommendations. Pt will stop Eliquis and start plavix. Pt will take plavix through 4/13/22 then discontinue it per post LAAC protocol. Pt will continue ASA 81mg daily. Patient verbalizes understanding and agrees with plan. No further questions or concerns at this time.

## 2021-12-14 ENCOUNTER — LAB REQUISITION (OUTPATIENT)
Dept: LAB | Facility: CLINIC | Age: 81
End: 2021-12-14
Payer: COMMERCIAL

## 2021-12-14 ENCOUNTER — TRANSFERRED RECORDS (OUTPATIENT)
Dept: HEALTH INFORMATION MANAGEMENT | Facility: CLINIC | Age: 81
End: 2021-12-14

## 2021-12-14 DIAGNOSIS — I10 ESSENTIAL (PRIMARY) HYPERTENSION: ICD-10-CM

## 2021-12-14 LAB
ANION GAP SERPL CALCULATED.3IONS-SCNC: 11 MMOL/L (ref 5–18)
BUN SERPL-MCNC: 15 MG/DL (ref 8–28)
CALCIUM SERPL-MCNC: 9.1 MG/DL (ref 8.5–10.5)
CHLORIDE BLD-SCNC: 102 MMOL/L (ref 98–107)
CHOLEST SERPL-MCNC: 160 MG/DL
CO2 SERPL-SCNC: 23 MMOL/L (ref 22–31)
CREAT SERPL-MCNC: 0.78 MG/DL (ref 0.6–1.1)
FASTING STATUS PATIENT QL REPORTED: ABNORMAL
GFR SERPL CREATININE-BSD FRML MDRD: 71 ML/MIN/1.73M2
GLUCOSE BLD-MCNC: 110 MG/DL (ref 70–125)
HDLC SERPL-MCNC: 40 MG/DL
LDLC SERPL CALC-MCNC: 97 MG/DL
POTASSIUM BLD-SCNC: 4 MMOL/L (ref 3.5–5)
SODIUM SERPL-SCNC: 136 MMOL/L (ref 136–145)
TRIGL SERPL-MCNC: 113 MG/DL

## 2021-12-14 PROCEDURE — 82310 ASSAY OF CALCIUM: CPT | Performed by: PHYSICIAN ASSISTANT

## 2021-12-14 PROCEDURE — 80061 LIPID PANEL: CPT | Performed by: PHYSICIAN ASSISTANT

## 2022-04-15 ENCOUNTER — TELEPHONE (OUTPATIENT)
Dept: CARDIOLOGY | Facility: CLINIC | Age: 82
End: 2022-04-15
Payer: COMMERCIAL

## 2022-04-15 NOTE — TELEPHONE ENCOUNTER
CATHERINE for patient to return call. Patient s/p LAAC 10/13/2021. [facility]    ----- Message from Yanira Mahan sent at 4/14/2022  1:45 PM CDT -----  Hey,  Patient is due for 6 mo follow up and will see Ping 4/28.    JUAN for perlita.    Thanks,  Yanira

## 2022-04-15 NOTE — TELEPHONE ENCOUNTER
MODIFIED LESLIE SCALE   Timepoint: 6mo Post-LAAC    Previous score: 0    Score Description   0 No symptoms at all   1 No significant disability despite symptoms; able to carry out all usual duties and activities   2 Slight disability; unable to carry out all previous activities, but able to look after own affairs without assistance   3 Moderate disability; requiring some help, but able to walk without assistance   4 Moderately severe disability; unable to walk without assistance and unable to attend to own bodily needs without assistance   5 Severe disability; bedridden, incontinent and requiring constant nursing care and attention   6 Dead    Total score (0 - 6):  0    Change in score if s/p LAAC? No  If yes, notify implanting cardiologist.    Julissa Dhaliwal RN BSN  Structural Heart Coordinator   [facility]  [phone]

## 2022-04-28 ENCOUNTER — OFFICE VISIT (OUTPATIENT)
Dept: CARDIOLOGY | Facility: CLINIC | Age: 82
End: 2022-04-28
Payer: COMMERCIAL

## 2022-04-28 VITALS
HEIGHT: 65 IN | SYSTOLIC BLOOD PRESSURE: 110 MMHG | BODY MASS INDEX: 24.49 KG/M2 | RESPIRATION RATE: 16 BRPM | HEART RATE: 76 BPM | DIASTOLIC BLOOD PRESSURE: 72 MMHG | WEIGHT: 147 LBS

## 2022-04-28 DIAGNOSIS — Z95.818 PRESENCE OF LEFT ATRIAL APPENDAGE CLOSURE DEVICE COMPOSED OF NICKEL-TITANIUM ALLOY WITH POLYETHYLENE TEREPHTHALATE MEMBRANE: ICD-10-CM

## 2022-04-28 DIAGNOSIS — Z98.890 STATUS POST CATHETER ABLATION OF ATRIAL FIBRILLATION: ICD-10-CM

## 2022-04-28 DIAGNOSIS — I48.0 PAROXYSMAL ATRIAL FIBRILLATION (H): Primary | ICD-10-CM

## 2022-04-28 DIAGNOSIS — I10 ESSENTIAL HYPERTENSION: ICD-10-CM

## 2022-04-28 PROBLEM — D50.9 IRON DEFICIENCY ANEMIA: Status: ACTIVE | Noted: 2022-04-28

## 2022-04-28 PROCEDURE — 99214 OFFICE O/P EST MOD 30 MIN: CPT | Performed by: NURSE PRACTITIONER

## 2022-04-28 RX ORDER — PNV NO.95/FERROUS FUM/FOLIC AC 28MG-0.8MG
325 TABLET ORAL DAILY
COMMUNITY

## 2022-04-28 NOTE — LETTER
4/28/2022    Chai Quintero MD, MD  [facility] [address]  [address] [place] [address]    RE: Estrellita Herring       Dear Colleague,     I had the pleasure of seeing Estrellita Herring in the [facility].    HEART CARE ELECTROPHYSIOLOGY NOTE      [facility]  [phone]      Assessment/Recommendations   Assessment/Plan:  1.  Paroxysmal atrial fibrillation: No symptomatology or evidence of recurrence of atrial fibrillation.  She remains off membrane active antiarrhythmic medications.       She has a [id number] score of 4 for age >75, female gender, and hypertension.  HAS-BLED score 2 for age > 65 and bleeding disposition with chronic blood loss anemia.     She underwent left atrial appendage closure with the Watchman device on 10/31/2022.  Post implant VASQUEZ showed a well-seated device with no peridevice flow or surface thrombus.  She has had no neurologic symptoms or events.  Clopidogrel discontinued on 4/13/2022.  Modified Atiya done 4/15/2022.  Continue aspirin 81 mg daily indefinitely.     2.  Hypertension: Blood pressure at target.  Continue metoprolol succinate 25 mg daily.    A. fib and 1 year post Watchman follow up in 6 months     History of Present Illness/Subjective    HPI: Estrellita Herring is a 81 year old female who comes in today for EP follow-up of of atrial fibrillation and 6 months post Watchman implant.  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    She had COVID-19 infection in January 2022.    She was diagnosed with paroxysmal atrial fibrillation with rapid ventricular response in November 2018, but symptom onset was almost a year prior.  She is quite symptomatic with A. fib; symptoms consist of palpitations and fatigue.  She failed antiarrhythmic therapy with sotalol.  She underwent pulmonary vein isolation ablation with Dr. Strong on  10/1/2019 with RF wide antral circumferential PVI.  Left atrial mapping showed normal voltage variability and no evidence of scar.  Sotalol 80 mg twice daily was restarted after PVI.  She had no recurrence of atrial arrhythmias, so sotalol was discontinued on 12/1/2020.   She was on Eliquis 5 mg twice daily for stroke prophylaxis, but with ongoing issues with chronic anemia and recurrent blood loss, though no obvious bleed.  She underwent left atrial appendage closure with a 24 mm Watchman FLX device on 10/31/2021.  Post implant VASQUEZ showed a well-seated device with no peridevice flow or surface thrombus.  Plavix was discontinued on 4/13/2022.  She remains on low-dose daily aspirin for stroke prophylaxis.    She was hospitalized while in [place] for anemia associated with dyspnea on exertion and decreased activity tolerance.  Hemoglobin was around 7 and she underwent transfusion.  She was subsequently started on iron and a multivitamin.     Estrellita states that she is feeling much better.  She no longer has dyspnea on exertion. She has not had any episodes of A. fib.  Neurologic symptoms or events.  She denies chest discomfort, palpitations, abdominal fullness/bloating or peripheral edema, shortness of breath at rest or with normal activity, paroxysmal nocturnal dyspnea, orthopnea, lightheadedness, dizziness, pre-syncope, or syncope.   She had a large hematoma on her left forearm which has now almost resolved.     Cardiographics (EKG personally reviewed):  EKG done 10/11/2021 shows sinus rhythm at 69 bpm, QT/QTc interval measures 382/409 ms     Event monitor worn 12/2/2019 through 12/15/2019 shows sinus rhythm with an average heart rate of 79 bpm and a range of 52 to 152 bpm.  There was a 7-second run of ectopic atrial tachycardia, but no atrial fibrillation.  Frequent PACs with a burden of 14%.  No significant bradycardia or pauses.  No significant ventricular ectopy.     Transesophageal echo done 11/26/2021:  Left  "ventricular size, wall motion and function are normal. The ejection  fraction is 60-65%.  Normal right ventricle size and systolic function.  Watchman device noted in left atrial appendage. The device is well seated with  no leakage by color flow Doppler imaging.  Thrombus absent on left atrial appendage occlusion device.    CT pulmonary veins done 9/17/2019:    Normal size and configuration of pulmonary veins.    The esophagus is adjacent to the posterior side of the left inferior pulmonary vein.  1.  No significant incidental extracardiac findings.  2.  Emphysematous change involving the lungs.  3.  Slight interval enlargement of a 12 mm peripherally enhancing lesion in segment 4A of the liver, which is favored to represent a cavernous hemangioma.      NM pharmacologic stress test done 8/30/2019:    The pharmacologic nuclear stress test is negative for inducible myocardial ischemia or infarction.    The left ventricular ejection fraction is 81%.    Exercise was attempted but the patient was not able to exercise beyond 5 minutes and attain target heart rate.  There is no prior study available.    I have reviewed and updated the patient's Past Medical History, Social History, Family History and Medication List.  Outside records personally reviewed.     Physical Examination  Review of Systems   Vitals: /72 (BP Location: Left arm, Patient Position: Sitting, Cuff Size: Adult Regular)   Pulse 76   Resp 16   Ht 1.651 m (5' 5\")   Wt 66.7 kg (147 lb)   BMI 24.46 kg/m    BMI= Body mass index is 24.46 kg/m .  Wt Readings from Last 3 Encounters:   04/28/22 66.7 kg (147 lb)   11/23/21 66.7 kg (147 lb)   10/15/21 67.6 kg (149 lb)       General Appearance:   Alert, well-appearing and in no acute distress.   HEENT: Atraumatic, normocephalic.  No scleral icterus, normal conjunctivae, EOMs intact, PERRL.  Wearing a mask.   Chest/Lungs:   Chest symmetric, spine straight.  Respirations unlabored.  Lungs are clear to " auscultation.   Cardiovascular:   Regular rate and rhythm.  Normal first and second heart sounds with no murmurs, rubs, or gallops; radial and posterior tibial pulses are intact, no edema.   Abdomen:  Soft, nondistended, bowel sounds present.   Extremities: No cyanosis or clubbing.   Musculoskeletal: Moves all extremities.     Skin: Warm, dry, intact.    Neurologic: Mood and affect are appropriate.  Alert and oriented to person, place, time, and situation.     ROS: 10 point ROS neg other than the symptoms noted above in the HPI.         Medical History  Surgical History Family History Social History   Past Medical History:   Diagnosis Date     Ankylosis of spine      Atrial fibrillation (H)      Diverticulitis      Hypertension      Iritis      Past Surgical History:   Procedure Laterality Date     CATARACT EXTRACTION, BILATERAL       COLECTOMY  2006    partial     CV LEFT ATRIAL APPENDAGE CLOSURE N/A 10/13/2021    Procedure: CV LEFT ATRIAL APPENDAGE CLOSURE;  Surgeon: Andrey Velázquez MD;  Location: [facility] CARDIAC CATH LAB     EP ABLATION  10/1/2019          EP ABLATION PVI Left 10/1/2019    Procedure: EP Ablation PVI;  Surgeon: Gudelia Strong MD;  Location: [facility] Cath Lab;  Service: Cardiology     TOTAL KNEE ARTHROPLASTY Left      Family History   Problem Relation Age of Onset     Alzheimer Disease Mother      Cancer Father         prostate     Atrial fibrillation Sister      Diabetes Type 2  Sister         Social History     Socioeconomic History     Marital status:      Spouse name: Not on file     Number of children: Not on file     Years of education: Not on file     Highest education level: Not on file   Occupational History     Not on file   Tobacco Use     Smoking status: Never Smoker     Smokeless tobacco: Never Used   Substance and Sexual Activity     Alcohol use: No     Drug use: No     Sexual activity: Not on file   Other Topics Concern     Not on file   Social History Narrative      Not on file     Social Determinants of Health     Financial Resource Strain: Not on file   Food Insecurity: Not on file   Transportation Needs: Not on file   Physical Activity: Not on file   Stress: Not on file   Social Connections: Not on file   Intimate Partner Violence: Not on file   Housing Stability: Not on file           Medications  Allergies   Current Outpatient Medications   Medication Sig Dispense Refill     aspirin (ASA) 81 MG EC tablet Take 1 tablet (81 mg) by mouth daily       Bisacodyl (LAXATIVE PO) Take 25 mg by mouth three times a week       Ferrous Sulfate (IRON) 325 (65 Fe) MG tablet Take 325 mg by mouth daily       inFLIXimab (REMICADE) 100 mg injection [INFLIXIMAB (REMICADE) 100 MG INJECTION] Infuse 400 mg into a venous catheter every 2 (two) months.       metoprolol succinate ER (TOPROL-XL) 25 MG 24 hr tablet Take 1 tablet by mouth once daily 90 tablet 1     Multiple Vitamin (MULTIVITAMIN ADULT PO) Take 1 tablet by mouth daily       omeprazole (PRILOSEC) 20 MG capsule [OMEPRAZOLE (PRILOSEC) 20 MG CAPSULE] TAKE 1 CAPSULE BY MOUTH ONCE DAILY OCCASIONALLY TWICE DAILY       vit C/E/Zn/coppr/lutein/zeaxan (PRESERVISION AREDS-2 ORAL) [VIT C/E/ZN/COPPR/LUTEIN/ZEAXAN (PRESERVISION AREDS-2 ORAL)] Take 1 tablet by mouth 2 (two) times a day.         Allergies   Allergen Reactions     Sulfa (Sulfonamide Antibiotics) [Sulfa Drugs] Rash     hallucinations          Lab Results    Chemistry/lipid CBC Cardiac Enzymes/BNP/TSH/INR   Recent Labs   Lab Test 12/14/21  1434   CHOL 160   HDL 40*   LDL 97   TRIG 113     Recent Labs   Lab Test 12/14/21  1434 10/30/20  1142 05/01/18  1619   LDL 97 93 114     Recent Labs   Lab Test 12/14/21  1434      POTASSIUM 4.0   CHLORIDE 102   CO2 23      BUN 15   CR 0.78   GFRESTIMATED 71   RICK 9.1     Recent Labs   Lab Test 12/14/21  1434 10/13/21  1324 10/11/21  0940   CR 0.78 0.70 0.76     Outside labs personally reviewed   Recent Labs   Lab Test 10/13/21  1324  10/11/21  0940   WBC  --  6.6   HGB 9.0* 10.1*   HCT  --  34.2*   MCV  --  84   PLT  --  333     Recent Labs   Lab Test 10/13/21  1324 10/11/21  0940 08/05/21  1348   HGB 9.0* 10.1* 10.4*    No results for input(s): TROPONINI in the last 64951 hours.  No results for input(s): BNP, NTBNPI, NTBNP in the last 15007 hours.  Recent Labs   Lab Test 10/22/18  1254   TSH 2.30     No results for input(s): INR in the last 89832 hours.                 Thank you for allowing me to participate in the care of your patient.      Sincerely,     LATISHA [facility]  cc:   No referring provider defined for this encounter.

## 2022-04-28 NOTE — PROGRESS NOTES
HEART CARE ELECTROPHYSIOLOGY NOTE      [facility]  [phone]      Assessment/Recommendations   Assessment/Plan:  1.  Paroxysmal atrial fibrillation: No symptomatology or evidence of recurrence of atrial fibrillation.  She remains off membrane active antiarrhythmic medications.       She has a [id number] score of 4 for age >75, female gender, and hypertension.  HAS-BLED score 2 for age > 65 and bleeding disposition with chronic blood loss anemia.     She underwent left atrial appendage closure with the Watchman device on 10/31/2022.  Post implant VASQUEZ showed a well-seated device with no peridevice flow or surface thrombus.  She has had no neurologic symptoms or events.  Clopidogrel discontinued on 4/13/2022.  Modified [place] done 4/15/2022.  Continue aspirin 81 mg daily indefinitely.     2.  Hypertension: Blood pressure at target.  Continue metoprolol succinate 25 mg daily.    A. fib and 1 year post Watchman follow up in 6 months     History of Present Illness/Subjective    HPI: Estrellita Herring is a 81 year old female who comes in today for EP follow-up of of atrial fibrillation and 6 months post Watchman implant.  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    She had COVID-19 infection in January 2022.    She was diagnosed with paroxysmal atrial fibrillation with rapid ventricular response in November 2018, but symptom onset was almost a year prior.  She is quite symptomatic with A. fib; symptoms consist of palpitations and fatigue.  She failed antiarrhythmic therapy with sotalol.  She underwent pulmonary vein isolation ablation with Dr. Strong on 10/1/2019 with RF wide antral circumferential PVI.  Left atrial mapping showed normal voltage variability and no evidence of scar.  Sotalol 80 mg twice daily was restarted after PVI.  She had no recurrence of atrial arrhythmias, so sotalol  was discontinued on 12/1/2020.   She was on Eliquis 5 mg twice daily for stroke prophylaxis, but with ongoing issues with chronic anemia and recurrent blood loss, though no obvious bleed.  She underwent left atrial appendage closure with a 24 mm Watchman FLX device on 10/31/2021.  Post implant VASQUEZ showed a well-seated device with no peridevice flow or surface thrombus.  Plavix was discontinued on 4/13/2022.  She remains on low-dose daily aspirin for stroke prophylaxis.    She was hospitalized while in [place] for anemia associated with dyspnea on exertion and decreased activity tolerance.  Hemoglobin was around 7 and she underwent transfusion.  She was subsequently started on iron and a multivitamin.     Estrellita states that she is feeling much better.  She no longer has dyspnea on exertion. She has not had any episodes of A. fib.  Neurologic symptoms or events.  She denies chest discomfort, palpitations, abdominal fullness/bloating or peripheral edema, shortness of breath at rest or with normal activity, paroxysmal nocturnal dyspnea, orthopnea, lightheadedness, dizziness, pre-syncope, or syncope.   She had a large hematoma on her left forearm which has now almost resolved.     Cardiographics (EKG personally reviewed):  EKG done 10/11/2021 shows sinus rhythm at 69 bpm, QT/QTc interval measures 382/409 ms     Event monitor worn 12/2/2019 through 12/15/2019 shows sinus rhythm with an average heart rate of 79 bpm and a range of 52 to 152 bpm.  There was a 7-second run of ectopic atrial tachycardia, but no atrial fibrillation.  Frequent PACs with a burden of 14%.  No significant bradycardia or pauses.  No significant ventricular ectopy.     Transesophageal echo done 11/26/2021:  Left ventricular size, wall motion and function are normal. The ejection  fraction is 60-65%.  Normal right ventricle size and systolic function.  Watchman device noted in left atrial appendage. The device is well seated with  no leakage by color  "flow Doppler imaging.  Thrombus absent on left atrial appendage occlusion device.    CT pulmonary veins done 9/17/2019:  Normal size and configuration of pulmonary veins.  The esophagus is adjacent to the posterior side of the left inferior pulmonary vein.  1.  No significant incidental extracardiac findings.  2.  Emphysematous change involving the lungs.  3.  Slight interval enlargement of a 12 mm peripherally enhancing lesion in segment 4A of the liver, which is favored to represent a cavernous hemangioma.      NM pharmacologic stress test done 8/30/2019:  The pharmacologic nuclear stress test is negative for inducible myocardial ischemia or infarction.  The left ventricular ejection fraction is 81%.  Exercise was attempted but the patient was not able to exercise beyond 5 minutes and attain target heart rate.  There is no prior study available.    I have reviewed and updated the patient's Past Medical History, Social History, Family History and Medication List.  Outside records personally reviewed.     Physical Examination  Review of Systems   Vitals: /72 (BP Location: Left arm, Patient Position: Sitting, Cuff Size: Adult Regular)   Pulse 76   Resp 16   Ht 1.651 m (5' 5\")   Wt 66.7 kg (147 lb)   BMI 24.46 kg/m    BMI= Body mass index is 24.46 kg/m .  Wt Readings from Last 3 Encounters:   04/28/22 66.7 kg (147 lb)   11/23/21 66.7 kg (147 lb)   10/15/21 67.6 kg (149 lb)       General Appearance:   Alert, well-appearing and in no acute distress.   HEENT: Atraumatic, normocephalic.  No scleral icterus, normal conjunctivae, EOMs intact, PERRL.  Wearing a mask.   Chest/Lungs:   Chest symmetric, spine straight.  Respirations unlabored.  Lungs are clear to auscultation.   Cardiovascular:   Regular rate and rhythm.  Normal first and second heart sounds with no murmurs, rubs, or gallops; radial and posterior tibial pulses are intact, no edema.   Abdomen:  Soft, nondistended, bowel sounds present.   Extremities: " No cyanosis or clubbing.   Musculoskeletal: Moves all extremities.     Skin: Warm, dry, intact.    Neurologic: Mood and affect are appropriate.  Alert and oriented to person, place, time, and situation.     ROS: 10 point ROS neg other than the symptoms noted above in the HPI.         Medical History  Surgical History Family History Social History   Past Medical History:   Diagnosis Date     Ankylosis of spine      Atrial fibrillation (H)      Diverticulitis      Hypertension      Iritis      Past Surgical History:   Procedure Laterality Date     CATARACT EXTRACTION, BILATERAL       COLECTOMY  2006    partial     CV LEFT ATRIAL APPENDAGE CLOSURE N/A 10/13/2021    Procedure: CV LEFT ATRIAL APPENDAGE CLOSURE;  Surgeon: Andrey Velázquez MD;  Location: [facility] CARDIAC CATH LAB     EP ABLATION  10/1/2019          EP ABLATION PVI Left 10/1/2019    Procedure: EP Ablation PVI;  Surgeon: Gudelia Strong MD;  Location: [facility] Cath Lab;  Service: Cardiology     TOTAL KNEE ARTHROPLASTY Left      Family History   Problem Relation Age of Onset     Alzheimer Disease Mother      Cancer Father         prostate     Atrial fibrillation Sister      Diabetes Type 2  Sister         Social History     Socioeconomic History     Marital status:      Spouse name: Not on file     Number of children: Not on file     Years of education: Not on file     Highest education level: Not on file   Occupational History     Not on file   Tobacco Use     Smoking status: Never Smoker     Smokeless tobacco: Never Used   Substance and Sexual Activity     Alcohol use: No     Drug use: No     Sexual activity: Not on file   Other Topics Concern     Not on file   Social History Narrative     Not on file     Social Determinants of Health     Financial Resource Strain: Not on file   Food Insecurity: Not on file   Transportation Needs: Not on file   Physical Activity: Not on file   Stress: Not on file   Social Connections: Not on file   Intimate  Partner Violence: Not on file   Housing Stability: Not on file           Medications  Allergies   Current Outpatient Medications   Medication Sig Dispense Refill     aspirin (ASA) 81 MG EC tablet Take 1 tablet (81 mg) by mouth daily       Bisacodyl (LAXATIVE PO) Take 25 mg by mouth three times a week       Ferrous Sulfate (IRON) 325 (65 Fe) MG tablet Take 325 mg by mouth daily       inFLIXimab (REMICADE) 100 mg injection [INFLIXIMAB (REMICADE) 100 MG INJECTION] Infuse 400 mg into a venous catheter every 2 (two) months.       metoprolol succinate ER (TOPROL-XL) 25 MG 24 hr tablet Take 1 tablet by mouth once daily 90 tablet 1     Multiple Vitamin (MULTIVITAMIN ADULT PO) Take 1 tablet by mouth daily       omeprazole (PRILOSEC) 20 MG capsule [OMEPRAZOLE (PRILOSEC) 20 MG CAPSULE] TAKE 1 CAPSULE BY MOUTH ONCE DAILY OCCASIONALLY TWICE DAILY       vit C/E/Zn/coppr/lutein/zeaxan (PRESERVISION AREDS-2 ORAL) [VIT C/E/ZN/COPPR/LUTEIN/ZEAXAN (PRESERVISION AREDS-2 ORAL)] Take 1 tablet by mouth 2 (two) times a day.         Allergies   Allergen Reactions     Sulfa (Sulfonamide Antibiotics) [Sulfa Drugs] Rash     hallucinations          Lab Results    Chemistry/lipid CBC Cardiac Enzymes/BNP/TSH/INR   Recent Labs   Lab Test 12/14/21  1434   CHOL 160   HDL 40*   LDL 97   TRIG 113     Recent Labs   Lab Test 12/14/21  1434 10/30/20  1142 05/01/18  1619   LDL 97 93 114     Recent Labs   Lab Test 12/14/21  1434      POTASSIUM 4.0   CHLORIDE 102   CO2 23      BUN 15   CR 0.78   GFRESTIMATED 71   RICK 9.1     Recent Labs   Lab Test 12/14/21  1434 10/13/21  1324 10/11/21  0940   CR 0.78 0.70 0.76     Outside labs personally reviewed   Recent Labs   Lab Test 10/13/21  1324 10/11/21  0940   WBC  --  6.6   HGB 9.0* 10.1*   HCT  --  34.2*   MCV  --  84   PLT  --  333     Recent Labs   Lab Test 10/13/21  1324 10/11/21  0940 08/05/21  1348   HGB 9.0* 10.1* 10.4*    No results for input(s): TROPONINI in the last 12625 hours.  No results  for input(s): BNP, NTBNPI, NTBNP in the last 17147 hours.  Recent Labs   Lab Test 10/22/18  1254   TSH 2.30     No results for input(s): INR in the last 90853 hours.

## 2022-04-28 NOTE — PATIENT INSTRUCTIONS
Estrellita Herring,    It was a pleasure to see you today at the [facility].     My recommendations after this visit include:    Continue aspirin 81 mg daily  Continue metoprolol XL 25 mg daily    Follow up in 6 months    Ping Raya, CNP  [facility], Electrophysiology  [phone]  EP nurses [phone]

## 2022-04-28 NOTE — Clinical Note
I can combine her A fib and Watchman follow ups in 6 months (will be 1 year s/p LAAC and due to A fib annual). Thanks, Ping

## 2022-05-03 ENCOUNTER — LAB REQUISITION (OUTPATIENT)
Dept: LAB | Facility: CLINIC | Age: 82
End: 2022-05-03
Payer: COMMERCIAL

## 2022-05-03 DIAGNOSIS — N30.00 ACUTE CYSTITIS WITHOUT HEMATURIA: ICD-10-CM

## 2022-05-03 PROCEDURE — 87086 URINE CULTURE/COLONY COUNT: CPT | Mod: ORL | Performed by: PHYSICIAN ASSISTANT

## 2022-05-05 LAB — BACTERIA UR CULT: ABNORMAL

## 2022-11-30 ENCOUNTER — TELEPHONE (OUTPATIENT)
Dept: CARDIOLOGY | Facility: CLINIC | Age: 82
End: 2022-11-30

## 2022-11-30 NOTE — TELEPHONE ENCOUNTER
Left Voicemail for return call with direct number left. Patient is 1 year post watchman 10/13/21. Needs follow up appointment scheduled with Dr. Chowdhury. -SC

## 2022-12-01 NOTE — TELEPHONE ENCOUNTER
MODIFIED LESLIE SCALE   Timepoint: 1yr Post-LAAC    Previous score: 0    Score Description   0 No symptoms at all   1 No significant disability despite symptoms; able to carry out all usual duties and activities   2 Slight disability; unable to carry out all previous activities, but able to look after own affairs without assistance   3 Moderate disability; requiring some help, but able to walk without assistance   4 Moderately severe disability; unable to walk without assistance and unable to attend to own bodily needs without assistance   5 Severe disability; bedridden, incontinent and requiring constant nursing care and attention   6 Dead    Total score (0 - 6):  0    Change in score if s/p LAAC? No  If yes, notify implanting cardiologist.    No dx of stroke or TIA. Patient instructed that she is due for 1 year follow up with Dr. Chowdhury and patient stated she did not want to be transferred over to scheduling rather would call since she is going to [place] and wants to confirm her dates that would work.     Linda Fried RN BSN  Structural Heart Coordinator   [facility]  [phone]

## 2022-12-21 ENCOUNTER — LAB REQUISITION (OUTPATIENT)
Dept: LAB | Facility: CLINIC | Age: 82
End: 2022-12-21
Payer: COMMERCIAL

## 2022-12-21 DIAGNOSIS — I10 ESSENTIAL (PRIMARY) HYPERTENSION: ICD-10-CM

## 2022-12-21 DIAGNOSIS — Z13.220 ENCOUNTER FOR SCREENING FOR LIPOID DISORDERS: ICD-10-CM

## 2022-12-21 LAB
ANION GAP SERPL CALCULATED.3IONS-SCNC: 12 MMOL/L (ref 7–15)
BUN SERPL-MCNC: 12.4 MG/DL (ref 8–23)
CALCIUM SERPL-MCNC: 10.1 MG/DL (ref 8.8–10.2)
CHLORIDE SERPL-SCNC: 100 MMOL/L (ref 98–107)
CHOLEST SERPL-MCNC: 185 MG/DL
CREAT SERPL-MCNC: 0.72 MG/DL (ref 0.51–0.95)
DEPRECATED HCO3 PLAS-SCNC: 27 MMOL/L (ref 22–29)
GFR SERPL CREATININE-BSD FRML MDRD: 83 ML/MIN/1.73M2
GLUCOSE SERPL-MCNC: 91 MG/DL (ref 70–99)
HDLC SERPL-MCNC: 46 MG/DL
LDLC SERPL CALC-MCNC: 111 MG/DL
NONHDLC SERPL-MCNC: 139 MG/DL
POTASSIUM SERPL-SCNC: 4.5 MMOL/L (ref 3.4–5.3)
SODIUM SERPL-SCNC: 139 MMOL/L (ref 136–145)
TRIGL SERPL-MCNC: 142 MG/DL

## 2022-12-21 PROCEDURE — 80048 BASIC METABOLIC PNL TOTAL CA: CPT | Mod: ORL | Performed by: PHYSICIAN ASSISTANT

## 2022-12-21 PROCEDURE — 80061 LIPID PANEL: CPT | Mod: ORL | Performed by: PHYSICIAN ASSISTANT

## 2023-09-08 ENCOUNTER — TRANSFERRED RECORDS (OUTPATIENT)
Dept: HEALTH INFORMATION MANAGEMENT | Facility: CLINIC | Age: 83
End: 2023-09-08

## 2023-10-06 ENCOUNTER — TELEPHONE (OUTPATIENT)
Dept: CARDIOLOGY | Facility: CLINIC | Age: 83
End: 2023-10-06
Payer: COMMERCIAL

## 2023-10-06 DIAGNOSIS — Z95.818 PRESENCE OF LEFT ATRIAL APPENDAGE CLOSURE DEVICE COMPOSED OF NICKEL-TITANIUM ALLOY WITH POLYETHYLENE TEREPHTHALATE MEMBRANE: Primary | ICD-10-CM

## 2023-10-06 DIAGNOSIS — I48.0 PAROXYSMAL ATRIAL FIBRILLATION (H): ICD-10-CM

## 2023-10-06 NOTE — TELEPHONE ENCOUNTER
MODIFIED LESLIE SCALE   Timepoint: 2yr Post-LAAC    Previous score: 0    Score Description   0 No symptoms at all   1 No significant disability despite symptoms; able to carry out all usual duties and activities   2 Slight disability; unable to carry out all previous activities, but able to look after own affairs without assistance   3 Moderate disability; requiring some help, but able to walk without assistance   4 Moderately severe disability; unable to walk without assistance and unable to attend to own bodily needs without assistance   5 Severe disability; bedridden, incontinent and requiring constant nursing care and attention   6 Dead    Total score (0 - 6):  0    Change in score if s/p LAAC? No  If yes, notify implanting cardiologist.    Julissa Dhaliwal RN BSN  Structural Heart Coordinator   [facility]  [phone]

## 2023-10-06 NOTE — TELEPHONE ENCOUNTER
CC: followup of hypertension  HPI:  The patient is a 36 y.o. year old female who presents to the office for followup of hypertension.  The patient denies any shortness of breath, headache or blurred vision, but complains of intermittent sharp chest pain x 1-1/2 weeks, fatigue and intermittent nausea.  Pain lasts a few seconds and are independent of activity.  She also complains of pain under her left jaw.  Pain is constant and started about 2 weeks ago.  Pain is worse with swallowing and sneezing.    PAST MEDICAL HISTORY:  Past Medical History:   Diagnosis Date    CKD (chronic kidney disease), stage III     HTN (hypertension)        SURGICAL HISTORY:  Past Surgical History:   Procedure Laterality Date    TONSILLECTOMY, ADENOIDECTOMY         MEDS:  Medcard reviewed and updated    ALLERGIES: Allergy Card reviewed and updated    SOCIAL HISTORY:   The patient is a nonsmoker.    PE:   APPEARANCE: Well nourished, well developed, in no acute distress.    EARS: TM's intact. No retraction or perforation.    NOSE: Mucosa pink. Airway clear.  MOUTH & THROAT: No tonsillar enlargement. No pharyngeal erythema or exudate. No stridor.  NODES: Positive cervical lymph node enlargement.  CHEST: Lungs clear to auscultation with unlabored respirations.  CARDIOVASCULAR: Normal S1, S2. No murmurs. No carotid bruits. No pedal edema.  ABDOMEN: Bowel sounds normal. Not distended. Soft. No tenderness or masses.   PSYCHIATRIC: The patient is oriented to person, place, and time and has a pleasant affect.        ASSESSMENT/PLAN:  Yudelka was seen today for headache.    Diagnoses and all orders for this visit:    Swollen lymph nodes  -      Prescribe Z-Abhishek    Other orders  -     azithromycin (Z-ABHISHEK) 250 MG tablet; Take 2 tablets by mouth on day 1; Take 1 tablet by mouth on days 2-5           Patient s/p LAAC 10/13/2021. Per post-LAAC medication protocol, patient to remain on once daily 81 mg ASA for life. Due for in clinic follow-up. LM for return call. TASHI

## 2023-10-09 ENCOUNTER — OFFICE VISIT (OUTPATIENT)
Dept: CARDIOLOGY | Facility: CLINIC | Age: 83
End: 2023-10-09
Payer: COMMERCIAL

## 2023-10-09 VITALS
HEART RATE: 50 BPM | BODY MASS INDEX: 24.13 KG/M2 | RESPIRATION RATE: 16 BRPM | DIASTOLIC BLOOD PRESSURE: 80 MMHG | SYSTOLIC BLOOD PRESSURE: 136 MMHG | WEIGHT: 145 LBS

## 2023-10-09 DIAGNOSIS — I10 ESSENTIAL HYPERTENSION: ICD-10-CM

## 2023-10-09 DIAGNOSIS — I48.0 PAROXYSMAL ATRIAL FIBRILLATION (H): Primary | ICD-10-CM

## 2023-10-09 DIAGNOSIS — Z98.890 STATUS POST CATHETER ABLATION OF ATRIAL FIBRILLATION: ICD-10-CM

## 2023-10-09 DIAGNOSIS — Z95.818 PRESENCE OF LEFT ATRIAL APPENDAGE CLOSURE DEVICE COMPOSED OF NICKEL-TITANIUM ALLOY WITH POLYETHYLENE TEREPHTHALATE MEMBRANE: ICD-10-CM

## 2023-10-09 PROCEDURE — 99214 OFFICE O/P EST MOD 30 MIN: CPT | Performed by: INTERNAL MEDICINE

## 2023-10-09 RX ORDER — TRAZODONE HYDROCHLORIDE 50 MG/1
TABLET, FILM COATED ORAL PRN
COMMUNITY
Start: 2022-12-21

## 2023-10-09 RX ORDER — CALCIUM POLYCARBOPHIL 625 MG
2 TABLET ORAL PRN
COMMUNITY

## 2023-10-09 RX ORDER — CLOBETASOL PROPIONATE 0.5 MG/G
OINTMENT TOPICAL
COMMUNITY
Start: 2022-11-03

## 2023-10-09 NOTE — LETTER
10/9/2023    Chai Quintero MD, MD  [address] [place] [address]    RE: Estrellita Herring       Dear Colleague,     I had the pleasure of seeing Estrellita Herring in the [facility].  HEART CARE ENCOUNTER NOTE       [facility]  [phone]      Assessment/Recommendations   1.  Paroxysmal atrial fibrillation -the patient is 2 years status post watchman placement (10/13/21).  She is doing well and has had no new strokelike symptoms.  She continues to take aspirin 81 mg daily and will do so indefinitely.    Modified [place] done on 10/6/2023 by watchman nurse.    She underwent atrial fibrillation ablation on 10/1/2019 and hasn't had recurrence.  She can follow-up as needed with the atrial fibrillation team    2.  Hypertension -blood pressure today is at goal.  Continue metoprolol succinate 25 mg daily    3.  Iron deficiency anemia -on iron supplementation.  Last documented hemoglobin in chart is 11.7 (from April 2022)       History of Present Illness/Subjective    Estrellita Herring is a 83 year old female who comes in today for her 2-year follow-up visit after watchman implant    Estrellita Herring has a past history of atrial fibrillation, hypertension, GERD, ankylosing spondylitis and chronic iron deficiency anemia.  She underwent atrial fibrillation ablation in October 2019 and then underwent watchman implant in October 2021.  She is now on just baby aspirin.  She has mild bruising and occasional palpitations that are only brief.    Otherwise, Estrellita denies chest discomfort, shortness of breath, paroxysmal nocturnal dyspnea, orthopnea, lightheadedness, dizziness, pre-syncope, or syncope.  Estrellita Herring also denies any weight loss, changes in appetite, nausea or vomiting.     Medical, surgical, family, social history, and medications were reviewed and updated as necessary.         Physical Examination Review of Systems   Vitals: /80 (BP Location: Left arm,  Patient Position: Sitting, Cuff Size: Adult Regular)   Pulse 50   Resp 16   Wt 65.8 kg (145 lb)   BMI 24.13 kg/m    BMI= Body mass index is 24.13 kg/m .  Wt Readings from Last 3 Encounters:   10/09/23 65.8 kg (145 lb)   04/28/22 66.7 kg (147 lb)   11/23/21 66.7 kg (147 lb)       General Appearance:   Alert, cooperative and in no acute distress   ENT/Mouth: membranes moist, no oral lesions or bleeding gums.      EYES:  no scleral icterus, normal conjunctivae   Neck: Thyroid not visualized   Chest/Lungs:   lungs are clear to auscultation, no rales or wheezing   Cardiovascular:   Regular . Normal first and second heart sounds with no murmurs, rubs or gallops; the carotid, radial and posterior tibial pulses are intact, no edema bilaterally    Abdomen:  Soft and nontender. Bowel sounds are present in all quadrants   Extremities: no cyanosis or clubbing   Skin: no xanthelasma, warm.    Neurologic: normal gait, normal  bilateral, no tremors   Psychiatric: Normal mood and affect       Please refer above for cardiac ROS details.      Medical History  Surgical History Family History Social History   Past Medical History:   Diagnosis Date    Ankylosis of spine     Atrial fibrillation (H)     Diverticulitis     Hypertension     Iritis     Iron deficiency anemia      Past Surgical History:   Procedure Laterality Date    CATARACT EXTRACTION, BILATERAL      COLECTOMY  2006    partial    CV LEFT ATRIAL APPENDAGE CLOSURE N/A 10/13/2021    Procedure: CV LEFT ATRIAL APPENDAGE CLOSURE;  Surgeon: Andrey Velázquez MD;  Location: [facility] CARDIAC CATH LAB    EP ABLATION  10/1/2019         EP ABLATION PVI Left 10/1/2019    Procedure: EP Ablation PVI;  Surgeon: Gudelia Strong MD;  Location: Health system Cath Lab;  Service: Cardiology    TOTAL KNEE ARTHROPLASTY Left      Family History   Problem Relation Age of Onset    Alzheimer Disease Mother     Cancer Father         prostate    Atrial fibrillation Sister     Diabetes Type 2   Sister     Social History     Socioeconomic History    Marital status:      Spouse name: Not on file    Number of children: Not on file    Years of education: Not on file    Highest education level: Not on file   Occupational History    Not on file   Tobacco Use    Smoking status: Never    Smokeless tobacco: Never   Substance and Sexual Activity    Alcohol use: No    Drug use: No    Sexual activity: Not on file   Other Topics Concern    Not on file   Social History Narrative    Not on file     Social Determinants of Health     Financial Resource Strain: Not on file   Food Insecurity: Not on file   Transportation Needs: Not on file   Physical Activity: Not on file   Stress: Not on file   Social Connections: Not on file   Interpersonal Safety: Not on file   Housing Stability: Not on file          Medications  Allergies   Current Outpatient Medications   Medication Sig Dispense Refill    aspirin (ASA) 81 MG EC tablet Take 1 tablet (81 mg) by mouth daily      Bisacodyl (LAXATIVE PO) Take 25 mg by mouth as needed      Calcium Polycarbophil (FIBER) 625 MG tablet 2 tablets as needed      clobetasol (TEMOVATE) 0.05 % external ointment       Ferrous Sulfate (IRON) 325 (65 Fe) MG tablet Take 325 mg by mouth daily      inFLIXimab (REMICADE) 100 mg injection [INFLIXIMAB (REMICADE) 100 MG INJECTION] Infuse 400 mg into a venous catheter every 2 (two) months.      metoprolol succinate ER (TOPROL-XL) 25 MG 24 hr tablet Take 1 tablet by mouth once daily 90 tablet 1    Multiple Vitamin (MULTIVITAMIN ADULT PO) Take 1 tablet by mouth daily      traZODone (DESYREL) 50 MG tablet as needed      vit C/E/Zn/coppr/lutein/zeaxan (PRESERVISION AREDS-2 ORAL) [VIT C/E/ZN/COPPR/LUTEIN/ZEAXAN (PRESERVISION AREDS-2 ORAL)] Take 1 tablet by mouth 2 (two) times a day.      omeprazole (PRILOSEC) 20 MG capsule [OMEPRAZOLE (PRILOSEC) 20 MG CAPSULE] TAKE 1 CAPSULE BY MOUTH ONCE DAILY OCCASIONALLY TWICE DAILY (Patient not taking: Reported on  10/9/2023)      Allergies   Allergen Reactions    Sulfa (Sulfonamide Antibiotics) [Sulfa Antibiotics] Rash     hallucinations         Lab Results    Chemistry/lipid CBC Cardiac Enzymes/BNP/TSH/INR   Recent Labs   Lab Test 12/21/22  1150   CHOL 185   HDL 46*   *   TRIG 142     Recent Labs   Lab Test 12/21/22  1150 12/14/21  1434 10/30/20  1142   * 97 93     Recent Labs   Lab Test 12/21/22  1150      POTASSIUM 4.5   CHLORIDE 100   CO2 27   GLC 91   BUN 12.4   CR 0.72   GFRESTIMATED 83   RICK 10.1     Recent Labs   Lab Test 12/21/22  1150 12/14/21  1434 10/13/21  1324   CR 0.72 0.78 0.70     No results for input(s): A1C in the last 60628 hours. Recent Labs   Lab Test 10/13/21  1324 10/11/21  0940   WBC  --  6.6   HGB 9.0* 10.1*   HCT  --  34.2*   MCV  --  84   PLT  --  333     Recent Labs   Lab Test 10/13/21  1324 10/11/21  0940 08/05/21  1348   HGB 9.0* 10.1* 10.4*    No results for input(s): TROPONINI in the last 19691 hours.  No results for input(s): BNP, NTBNPI, NTBNP in the last 95928 hours.  Recent Labs   Lab Test 10/22/18  1254   TSH 2.30     No results for input(s): INR in the last 69501 hours.     33 minutes spent on the date of encounter doing education, chart prep/review, review of outside records, patient visit, documentation, and discussion with other provider.      This note has been dictated using voice recognition software. Any grammatical or context distortions are unintentional and inherent to the software.          Thank you for allowing me to participate in the care of your patient.      Sincerely,     STEVE FAJARDO [facility]  cc:   No referring provider defined for this encounter.

## 2023-10-09 NOTE — PATIENT INSTRUCTIONS
Estrellita Herring,    It was a pleasure to see you today in the clinic regarding your 2-year Watchman visit.     My recommendations after this visit include:     - follow up as needed for your atrial fibrillation        If you have questions or concerns, please call using the numbers below:    After Hours/Scheduling  [phone]    Otherwise you can dial the nurse directly at:                  Julissa Dhaliwal RN  [phone]

## 2023-10-09 NOTE — PROGRESS NOTES
HEART CARE ENCOUNTER NOTE       [facility]  [phone]      Assessment/Recommendations   1.  Paroxysmal atrial fibrillation -the patient is 2 years status post watchman placement (10/13/21).  She is doing well and has had no new strokelike symptoms.  She continues to take aspirin 81 mg daily and will do so indefinitely.    Modified [place] done on 10/6/2023 by watchman nurse.    She underwent atrial fibrillation ablation on 10/1/2019 and hasn't had recurrence.  She can follow-up as needed with the atrial fibrillation team    2.  Hypertension -blood pressure today is at goal.  Continue metoprolol succinate 25 mg daily    3.  Iron deficiency anemia -on iron supplementation.  Last documented hemoglobin in chart is 11.7 (from April 2022)       History of Present Illness/Subjective    Estrellita Herring is a 83 year old female who comes in today for her 2-year follow-up visit after watchman implant    Estrellita Herring has a past history of atrial fibrillation, hypertension, GERD, ankylosing spondylitis and chronic iron deficiency anemia.  She underwent atrial fibrillation ablation in October 2019 and then underwent watchman implant in October 2021.  She is now on just baby aspirin.  She has mild bruising and occasional palpitations that are only brief.    Otherwise, Estrellita denies chest discomfort, shortness of breath, paroxysmal nocturnal dyspnea, orthopnea, lightheadedness, dizziness, pre-syncope, or syncope.  Estrellita Herring also denies any weight loss, changes in appetite, nausea or vomiting.     Medical, surgical, family, social history, and medications were reviewed and updated as necessary.         Physical Examination Review of Systems   Vitals: /80 (BP Location: Left arm, Patient Position: Sitting, Cuff Size: Adult Regular)   Pulse 50   Resp 16   Wt 65.8 kg (145 lb)   BMI 24.13 kg/m    BMI= Body mass index is 24.13 kg/m .  Wt Readings from Last 3 Encounters:   10/09/23 65.8 kg (145  lb)   04/28/22 66.7 kg (147 lb)   11/23/21 66.7 kg (147 lb)       General Appearance:   Alert, cooperative and in no acute distress   ENT/Mouth: membranes moist, no oral lesions or bleeding gums.      EYES:  no scleral icterus, normal conjunctivae   Neck: Thyroid not visualized   Chest/Lungs:   lungs are clear to auscultation, no rales or wheezing   Cardiovascular:   Regular . Normal first and second heart sounds with no murmurs, rubs or gallops; the carotid, radial and posterior tibial pulses are intact, no edema bilaterally    Abdomen:  Soft and nontender. Bowel sounds are present in all quadrants   Extremities: no cyanosis or clubbing   Skin: no xanthelasma, warm.    Neurologic: normal gait, normal  bilateral, no tremors   Psychiatric: Normal mood and affect       Please refer above for cardiac ROS details.      Medical History  Surgical History Family History Social History   Past Medical History:   Diagnosis Date    Ankylosis of spine     Atrial fibrillation (H)     Diverticulitis     Hypertension     Iritis     Iron deficiency anemia      Past Surgical History:   Procedure Laterality Date    CATARACT EXTRACTION, BILATERAL      COLECTOMY  2006    partial    CV LEFT ATRIAL APPENDAGE CLOSURE N/A 10/13/2021    Procedure: CV LEFT ATRIAL APPENDAGE CLOSURE;  Surgeon: Andrey Velázquez MD;  Location: [facility] CARDIAC CATH LAB    EP ABLATION  10/1/2019         EP ABLATION PVI Left 10/1/2019    Procedure: EP Ablation PVI;  Surgeon: Gudelia Strong MD;  Location: [facility] Cath Lab;  Service: Cardiology    TOTAL KNEE ARTHROPLASTY Left      Family History   Problem Relation Age of Onset    Alzheimer Disease Mother     Cancer Father         prostate    Atrial fibrillation Sister     Diabetes Type 2  Sister     Social History     Socioeconomic History    Marital status:      Spouse name: Not on file    Number of children: Not on file    Years of education: Not on file    Highest education level: Not on file    Occupational History    Not on file   Tobacco Use    Smoking status: Never    Smokeless tobacco: Never   Substance and Sexual Activity    Alcohol use: No    Drug use: No    Sexual activity: Not on file   Other Topics Concern    Not on file   Social History Narrative    Not on file     Social Determinants of Health     Financial Resource Strain: Not on file   Food Insecurity: Not on file   Transportation Needs: Not on file   Physical Activity: Not on file   Stress: Not on file   Social Connections: Not on file   Interpersonal Safety: Not on file   Housing Stability: Not on file          Medications  Allergies   Current Outpatient Medications   Medication Sig Dispense Refill    aspirin (ASA) 81 MG EC tablet Take 1 tablet (81 mg) by mouth daily      Bisacodyl (LAXATIVE PO) Take 25 mg by mouth as needed      Calcium Polycarbophil (FIBER) 625 MG tablet 2 tablets as needed      clobetasol (TEMOVATE) 0.05 % external ointment       Ferrous Sulfate (IRON) 325 (65 Fe) MG tablet Take 325 mg by mouth daily      inFLIXimab (REMICADE) 100 mg injection [INFLIXIMAB (REMICADE) 100 MG INJECTION] Infuse 400 mg into a venous catheter every 2 (two) months.      metoprolol succinate ER (TOPROL-XL) 25 MG 24 hr tablet Take 1 tablet by mouth once daily 90 tablet 1    Multiple Vitamin (MULTIVITAMIN ADULT PO) Take 1 tablet by mouth daily      traZODone (DESYREL) 50 MG tablet as needed      vit C/E/Zn/coppr/lutein/zeaxan (PRESERVISION AREDS-2 ORAL) [VIT C/E/ZN/COPPR/LUTEIN/ZEAXAN (PRESERVISION AREDS-2 ORAL)] Take 1 tablet by mouth 2 (two) times a day.      omeprazole (PRILOSEC) 20 MG capsule [OMEPRAZOLE (PRILOSEC) 20 MG CAPSULE] TAKE 1 CAPSULE BY MOUTH ONCE DAILY OCCASIONALLY TWICE DAILY (Patient not taking: Reported on 10/9/2023)      Allergies   Allergen Reactions    Sulfa (Sulfonamide Antibiotics) [Sulfa Antibiotics] Rash     hallucinations         Lab Results    Chemistry/lipid CBC Cardiac Enzymes/BNP/TSH/INR   Recent Labs   Lab Test  12/21/22  1150   CHOL 185   HDL 46*   *   TRIG 142     Recent Labs   Lab Test 12/21/22  1150 12/14/21  1434 10/30/20  1142   * 97 93     Recent Labs   Lab Test 12/21/22  1150      POTASSIUM 4.5   CHLORIDE 100   CO2 27   GLC 91   BUN 12.4   CR 0.72   GFRESTIMATED 83   RICK 10.1     Recent Labs   Lab Test 12/21/22  1150 12/14/21  1434 10/13/21  1324   CR 0.72 0.78 0.70     No results for input(s): A1C in the last 94409 hours. Recent Labs   Lab Test 10/13/21  1324 10/11/21  0940   WBC  --  6.6   HGB 9.0* 10.1*   HCT  --  34.2*   MCV  --  84   PLT  --  333     Recent Labs   Lab Test 10/13/21  1324 10/11/21  0940 08/05/21  1348   HGB 9.0* 10.1* 10.4*    No results for input(s): TROPONINI in the last 73125 hours.  No results for input(s): BNP, NTBNPI, NTBNP in the last 86783 hours.  Recent Labs   Lab Test 10/22/18  1254   TSH 2.30     No results for input(s): INR in the last 04922 hours.     33 minutes spent on the date of encounter doing education, chart prep/review, review of outside records, patient visit, documentation, and discussion with other provider.      This note has been dictated using voice recognition software. Any grammatical or context distortions are unintentional and inherent to the software.

## 2024-06-03 ENCOUNTER — LAB REQUISITION (OUTPATIENT)
Dept: LAB | Facility: CLINIC | Age: 84
End: 2024-06-03
Payer: COMMERCIAL

## 2024-06-03 DIAGNOSIS — I10 ESSENTIAL (PRIMARY) HYPERTENSION: ICD-10-CM

## 2024-06-03 PROCEDURE — 80061 LIPID PANEL: CPT | Mod: ORL | Performed by: FAMILY MEDICINE

## 2024-06-03 PROCEDURE — 80048 BASIC METABOLIC PNL TOTAL CA: CPT | Mod: ORL | Performed by: FAMILY MEDICINE

## 2024-06-04 LAB
ANION GAP SERPL CALCULATED.3IONS-SCNC: 10 MMOL/L (ref 7–15)
BUN SERPL-MCNC: 16.5 MG/DL (ref 8–23)
CALCIUM SERPL-MCNC: 10.1 MG/DL (ref 8.8–10.2)
CHLORIDE SERPL-SCNC: 101 MMOL/L (ref 98–107)
CHOLEST SERPL-MCNC: 186 MG/DL
CREAT SERPL-MCNC: 0.76 MG/DL (ref 0.51–0.95)
DEPRECATED HCO3 PLAS-SCNC: 26 MMOL/L (ref 22–29)
EGFRCR SERPLBLD CKD-EPI 2021: 77 ML/MIN/1.73M2
FASTING STATUS PATIENT QL REPORTED: ABNORMAL
FASTING STATUS PATIENT QL REPORTED: NORMAL
GLUCOSE SERPL-MCNC: 92 MG/DL (ref 70–99)
HDLC SERPL-MCNC: 43 MG/DL
LDLC SERPL CALC-MCNC: 113 MG/DL
NONHDLC SERPL-MCNC: 143 MG/DL
POTASSIUM SERPL-SCNC: 5 MMOL/L (ref 3.4–5.3)
SODIUM SERPL-SCNC: 137 MMOL/L (ref 135–145)
TRIGL SERPL-MCNC: 149 MG/DL

## 2025-04-25 ENCOUNTER — LAB REQUISITION (OUTPATIENT)
Dept: LAB | Facility: CLINIC | Age: 85
End: 2025-04-25
Payer: COMMERCIAL

## 2025-04-25 DIAGNOSIS — I10 ESSENTIAL (PRIMARY) HYPERTENSION: ICD-10-CM

## 2025-04-25 DIAGNOSIS — M81.0 AGE-RELATED OSTEOPOROSIS WITHOUT CURRENT PATHOLOGICAL FRACTURE: ICD-10-CM

## 2025-04-25 LAB
ANION GAP SERPL CALCULATED.3IONS-SCNC: 11 MMOL/L (ref 7–15)
BUN SERPL-MCNC: 22.6 MG/DL (ref 8–23)
CALCIUM SERPL-MCNC: 10.2 MG/DL (ref 8.8–10.4)
CHLORIDE SERPL-SCNC: 106 MMOL/L (ref 98–107)
CREAT SERPL-MCNC: 0.83 MG/DL (ref 0.51–0.95)
EGFRCR SERPLBLD CKD-EPI 2021: 69 ML/MIN/1.73M2
GLUCOSE SERPL-MCNC: 110 MG/DL (ref 70–99)
HCO3 SERPL-SCNC: 24 MMOL/L (ref 22–29)
POTASSIUM SERPL-SCNC: 4.6 MMOL/L (ref 3.4–5.3)
SODIUM SERPL-SCNC: 141 MMOL/L (ref 135–145)
VIT D+METAB SERPL-MCNC: 35 NG/ML (ref 20–50)

## 2025-04-25 PROCEDURE — 80048 BASIC METABOLIC PNL TOTAL CA: CPT | Mod: ORL

## 2025-04-25 PROCEDURE — 82306 VITAMIN D 25 HYDROXY: CPT | Mod: ORL

## 2025-07-23 ENCOUNTER — LAB REQUISITION (OUTPATIENT)
Dept: LAB | Facility: CLINIC | Age: 85
End: 2025-07-23
Payer: COMMERCIAL

## 2025-07-23 DIAGNOSIS — G40.909 EPILEPSY, UNSPECIFIED, NOT INTRACTABLE, WITHOUT STATUS EPILEPTICUS (H): ICD-10-CM

## 2025-07-23 PROCEDURE — 80177 DRUG SCRN QUAN LEVETIRACETAM: CPT | Mod: ORL | Performed by: FAMILY MEDICINE

## 2025-07-23 PROCEDURE — 80053 COMPREHEN METABOLIC PANEL: CPT | Mod: ORL | Performed by: FAMILY MEDICINE

## 2025-07-24 LAB
ALBUMIN SERPL BCG-MCNC: 4 G/DL (ref 3.5–5.2)
ALP SERPL-CCNC: 125 U/L (ref 40–150)
ALT SERPL W P-5'-P-CCNC: 8 U/L (ref 0–50)
ANION GAP SERPL CALCULATED.3IONS-SCNC: 11 MMOL/L (ref 7–15)
AST SERPL W P-5'-P-CCNC: 19 U/L (ref 0–45)
BILIRUB SERPL-MCNC: 0.2 MG/DL
BUN SERPL-MCNC: 18.4 MG/DL (ref 8–23)
CALCIUM SERPL-MCNC: 10.3 MG/DL (ref 8.8–10.4)
CHLORIDE SERPL-SCNC: 100 MMOL/L (ref 98–107)
CREAT SERPL-MCNC: 0.7 MG/DL (ref 0.51–0.95)
EGFRCR SERPLBLD CKD-EPI 2021: 84 ML/MIN/1.73M2
GLUCOSE SERPL-MCNC: 93 MG/DL (ref 70–99)
HCO3 SERPL-SCNC: 27 MMOL/L (ref 22–29)
LEVETIRACETAM SERPL-MCNC: 26.2 ÂΜG/ML (ref 10–40)
POTASSIUM SERPL-SCNC: 4.7 MMOL/L (ref 3.4–5.3)
PROT SERPL-MCNC: 7.3 G/DL (ref 6.4–8.3)
SODIUM SERPL-SCNC: 138 MMOL/L (ref 135–145)

## (undated) DEVICE — CLOSURE DEVICE 6FR VASC PROGLIDE MEDICATED SUTURE 12673-03

## (undated) DEVICE — CATH DIAG IMPULSE 6FR PIG 155 SINGLE

## (undated) DEVICE — INTRO SHEATH 8FRX10CM PINNACLE RSS802

## (undated) DEVICE — GUIDEWIRE VASC SAFARI2 0.035X275CM H74939406XS1

## (undated) DEVICE — PACK HEART LEFT CUSTOM

## (undated) DEVICE — INTRODUCER CHECK FLO 16FRX30CM .038

## (undated) DEVICE — INTRO MICRO MINI STICK 4FR STIFF NITINOL 45-753

## (undated) DEVICE — SHEATH WITH DILATOR ACCESS SYSTEM DOUBLE CURVE

## (undated) RX ORDER — LIDOCAINE HYDROCHLORIDE 20 MG/ML
INJECTION, SOLUTION EPIDURAL; INFILTRATION; INTRACAUDAL; PERINEURAL
Status: DISPENSED
Start: 2021-10-13

## (undated) RX ORDER — EPHEDRINE SULFATE 50 MG/ML
INJECTION, SOLUTION INTRAMUSCULAR; INTRAVENOUS; SUBCUTANEOUS
Status: DISPENSED
Start: 2021-10-13

## (undated) RX ORDER — ROCURONIUM BROMIDE 50 MG/5 ML
SYRINGE (ML) INTRAVENOUS
Status: DISPENSED
Start: 2021-10-13

## (undated) RX ORDER — ONDANSETRON 2 MG/ML
INJECTION INTRAMUSCULAR; INTRAVENOUS
Status: DISPENSED
Start: 2021-10-13

## (undated) RX ORDER — PROPOFOL 10 MG/ML
INJECTION, EMULSION INTRAVENOUS
Status: DISPENSED
Start: 2021-10-13

## (undated) RX ORDER — SODIUM CHLORIDE 9 MG/ML
INJECTION, SOLUTION INTRAVENOUS
Status: DISPENSED
Start: 2021-10-13

## (undated) RX ORDER — CEFAZOLIN SODIUM 2 G/100ML
INJECTION, SOLUTION INTRAVENOUS
Status: DISPENSED
Start: 2021-10-13

## (undated) RX ORDER — FENTANYL CITRATE 50 UG/ML
INJECTION, SOLUTION INTRAMUSCULAR; INTRAVENOUS
Status: DISPENSED
Start: 2021-10-13

## (undated) RX ORDER — ASPIRIN 81 MG/1
TABLET, CHEWABLE ORAL
Status: DISPENSED
Start: 2021-10-13